# Patient Record
Sex: MALE | Race: WHITE | Employment: FULL TIME | URBAN - METROPOLITAN AREA
[De-identification: names, ages, dates, MRNs, and addresses within clinical notes are randomized per-mention and may not be internally consistent; named-entity substitution may affect disease eponyms.]

---

## 2017-09-12 ENCOUNTER — LAB ENCOUNTER (OUTPATIENT)
Dept: LAB | Facility: HOSPITAL | Age: 37
End: 2017-09-12
Payer: OTHER GOVERNMENT

## 2017-09-12 DIAGNOSIS — Z31.41 ENCOUNTER FOR SEMEN ANALYSIS: Primary | ICD-10-CM

## 2017-09-12 PROCEDURE — 89321 SEMEN ANAL SPERM DETECTION: CPT

## 2021-11-12 ENCOUNTER — LAB ENCOUNTER (OUTPATIENT)
Dept: LAB | Age: 41
End: 2021-11-12
Attending: FAMILY MEDICINE
Payer: OTHER GOVERNMENT

## 2021-11-12 DIAGNOSIS — Z00.00 ANNUAL PHYSICAL EXAM: ICD-10-CM

## 2021-11-12 PROCEDURE — 85025 COMPLETE CBC W/AUTO DIFF WBC: CPT

## 2021-11-12 PROCEDURE — 80053 COMPREHEN METABOLIC PANEL: CPT

## 2021-11-12 PROCEDURE — 84443 ASSAY THYROID STIM HORMONE: CPT

## 2021-11-12 PROCEDURE — 80061 LIPID PANEL: CPT

## 2021-11-12 PROCEDURE — 36415 COLL VENOUS BLD VENIPUNCTURE: CPT

## 2021-11-12 NOTE — PROGRESS NOTES
Subjective:   Patient ID: Shelia Welsh is a 36year old male. New patient. Doing well. No complaints . Needs refill on fluoxetine. Takes that for anxiety/depression. History/Other:   Review of Systems     Constitutional: Negative.   Negative Refills   • FLUoxetine 20 MG Oral Cap 90 capsule 1     Sig: Take 1 capsule (20 mg total) by mouth daily.        Imaging & Referrals:  FLULAVAL INFLUENZA VACCINE QUAD PRESERVATIVE FREE 0.5 ML  OP REFERRAL TO PSYCHIATRY

## 2021-12-07 ENCOUNTER — TELEPHONE (OUTPATIENT)
Dept: FAMILY MEDICINE CLINIC | Facility: CLINIC | Age: 41
End: 2021-12-07

## 2021-12-07 RX ORDER — MULTIVITAMIN
TABLET ORAL
COMMUNITY

## 2022-11-19 ENCOUNTER — IMMUNIZATION (OUTPATIENT)
Dept: LAB | Age: 42
End: 2022-11-19
Attending: EMERGENCY MEDICINE
Payer: OTHER GOVERNMENT

## 2022-11-19 DIAGNOSIS — Z23 NEED FOR VACCINATION: Primary | ICD-10-CM

## 2022-11-19 PROCEDURE — 0134A SARSCOV2 VAC BVL 50MCG/0.5ML: CPT

## 2023-01-27 ENCOUNTER — LAB ENCOUNTER (OUTPATIENT)
Dept: LAB | Facility: HOSPITAL | Age: 43
End: 2023-01-27
Attending: FAMILY MEDICINE
Payer: OTHER GOVERNMENT

## 2023-01-27 ENCOUNTER — OFFICE VISIT (OUTPATIENT)
Dept: FAMILY MEDICINE CLINIC | Facility: CLINIC | Age: 43
End: 2023-01-27

## 2023-01-27 VITALS
RESPIRATION RATE: 18 BRPM | BODY MASS INDEX: 26.98 KG/M2 | HEIGHT: 64 IN | HEART RATE: 66 BPM | DIASTOLIC BLOOD PRESSURE: 68 MMHG | OXYGEN SATURATION: 98 % | SYSTOLIC BLOOD PRESSURE: 106 MMHG | WEIGHT: 158 LBS

## 2023-01-27 DIAGNOSIS — Z00.00 ANNUAL PHYSICAL EXAM: ICD-10-CM

## 2023-01-27 DIAGNOSIS — J45.990 EXERCISE-INDUCED ASTHMA: ICD-10-CM

## 2023-01-27 DIAGNOSIS — Z00.00 ANNUAL PHYSICAL EXAM: Primary | ICD-10-CM

## 2023-01-27 LAB
ALBUMIN SERPL-MCNC: 4.3 G/DL (ref 3.4–5)
ALBUMIN/GLOB SERPL: 1.4 {RATIO} (ref 1–2)
ALP LIVER SERPL-CCNC: 48 U/L
ALT SERPL-CCNC: 26 U/L
ANION GAP SERPL CALC-SCNC: 4 MMOL/L (ref 0–18)
AST SERPL-CCNC: 12 U/L (ref 15–37)
BASOPHILS # BLD AUTO: 0.01 X10(3) UL (ref 0–0.2)
BASOPHILS NFR BLD AUTO: 0.2 %
BILIRUB SERPL-MCNC: 0.3 MG/DL (ref 0.1–2)
BUN BLD-MCNC: 15 MG/DL (ref 7–18)
BUN/CREAT SERPL: 14.7 (ref 10–20)
CALCIUM BLD-MCNC: 9.1 MG/DL (ref 8.5–10.1)
CHLORIDE SERPL-SCNC: 103 MMOL/L (ref 98–112)
CHOLEST SERPL-MCNC: 211 MG/DL (ref ?–200)
CO2 SERPL-SCNC: 32 MMOL/L (ref 21–32)
CREAT BLD-MCNC: 1.02 MG/DL
DEPRECATED RDW RBC AUTO: 38.5 FL (ref 35.1–46.3)
EOSINOPHIL # BLD AUTO: 0.07 X10(3) UL (ref 0–0.7)
EOSINOPHIL NFR BLD AUTO: 1.5 %
ERYTHROCYTE [DISTWIDTH] IN BLOOD BY AUTOMATED COUNT: 11.6 % (ref 11–15)
FASTING PATIENT LIPID ANSWER: YES
FASTING STATUS PATIENT QL REPORTED: YES
GFR SERPLBLD BASED ON 1.73 SQ M-ARVRAT: 94 ML/MIN/1.73M2 (ref 60–?)
GLOBULIN PLAS-MCNC: 3.1 G/DL (ref 2.8–4.4)
GLUCOSE BLD-MCNC: 87 MG/DL (ref 70–99)
HCT VFR BLD AUTO: 40.1 %
HDLC SERPL-MCNC: 46 MG/DL (ref 40–59)
HGB BLD-MCNC: 13.5 G/DL
IMM GRANULOCYTES # BLD AUTO: 0.01 X10(3) UL (ref 0–1)
IMM GRANULOCYTES NFR BLD: 0.2 %
LDLC SERPL CALC-MCNC: 143 MG/DL (ref ?–100)
LYMPHOCYTES # BLD AUTO: 2.33 X10(3) UL (ref 1–4)
LYMPHOCYTES NFR BLD AUTO: 48.8 %
MCH RBC QN AUTO: 30.3 PG (ref 26–34)
MCHC RBC AUTO-ENTMCNC: 33.7 G/DL (ref 31–37)
MCV RBC AUTO: 90.1 FL
MONOCYTES # BLD AUTO: 0.27 X10(3) UL (ref 0.1–1)
MONOCYTES NFR BLD AUTO: 5.7 %
NEUTROPHILS # BLD AUTO: 2.08 X10 (3) UL (ref 1.5–7.7)
NEUTROPHILS # BLD AUTO: 2.08 X10(3) UL (ref 1.5–7.7)
NEUTROPHILS NFR BLD AUTO: 43.6 %
NONHDLC SERPL-MCNC: 165 MG/DL (ref ?–130)
OSMOLALITY SERPL CALC.SUM OF ELEC: 288 MOSM/KG (ref 275–295)
PLATELET # BLD AUTO: 182 10(3)UL (ref 150–450)
POTASSIUM SERPL-SCNC: 4.3 MMOL/L (ref 3.5–5.1)
PROT SERPL-MCNC: 7.4 G/DL (ref 6.4–8.2)
RBC # BLD AUTO: 4.45 X10(6)UL
SODIUM SERPL-SCNC: 139 MMOL/L (ref 136–145)
TRIGL SERPL-MCNC: 122 MG/DL (ref 30–149)
TSI SER-ACNC: 1.1 MIU/ML (ref 0.36–3.74)
VLDLC SERPL CALC-MCNC: 23 MG/DL (ref 0–30)
WBC # BLD AUTO: 4.8 X10(3) UL (ref 4–11)

## 2023-01-27 PROCEDURE — 99396 PREV VISIT EST AGE 40-64: CPT | Performed by: FAMILY MEDICINE

## 2023-01-27 PROCEDURE — 3008F BODY MASS INDEX DOCD: CPT | Performed by: FAMILY MEDICINE

## 2023-01-27 PROCEDURE — 84443 ASSAY THYROID STIM HORMONE: CPT

## 2023-01-27 PROCEDURE — 80061 LIPID PANEL: CPT

## 2023-01-27 PROCEDURE — 85025 COMPLETE CBC W/AUTO DIFF WBC: CPT

## 2023-01-27 PROCEDURE — 80053 COMPREHEN METABOLIC PANEL: CPT

## 2023-01-27 PROCEDURE — 36415 COLL VENOUS BLD VENIPUNCTURE: CPT

## 2023-01-27 PROCEDURE — 3078F DIAST BP <80 MM HG: CPT | Performed by: FAMILY MEDICINE

## 2023-01-27 PROCEDURE — 3074F SYST BP LT 130 MM HG: CPT | Performed by: FAMILY MEDICINE

## 2023-01-27 RX ORDER — ALBUTEROL SULFATE 90 UG/1
2 AEROSOL, METERED RESPIRATORY (INHALATION) EVERY 6 HOURS PRN
Qty: 3 EACH | Refills: 0 | Status: SHIPPED | OUTPATIENT
Start: 2023-01-27

## 2023-06-26 RX ORDER — TAMSULOSIN HYDROCHLORIDE 0.4 MG/1
CAPSULE ORAL
Qty: 30 CAPSULE | Refills: 0 | OUTPATIENT
Start: 2023-06-26

## 2023-06-26 NOTE — TELEPHONE ENCOUNTER
Last read by Lynda Baum at  5:57 PM on 6/23/2023. Patient saw [address] message sent--->see above. No response was received.      Order being refused --> prescriber not at this practice

## 2023-07-14 ENCOUNTER — OFFICE VISIT (OUTPATIENT)
Dept: FAMILY MEDICINE CLINIC | Facility: CLINIC | Age: 43
End: 2023-07-14

## 2023-07-14 ENCOUNTER — TELEPHONE (OUTPATIENT)
Dept: FAMILY MEDICINE CLINIC | Facility: CLINIC | Age: 43
End: 2023-07-14

## 2023-07-14 VITALS
HEIGHT: 64 IN | OXYGEN SATURATION: 95 % | SYSTOLIC BLOOD PRESSURE: 112 MMHG | HEART RATE: 72 BPM | DIASTOLIC BLOOD PRESSURE: 64 MMHG | WEIGHT: 151.63 LBS | RESPIRATION RATE: 16 BRPM | BODY MASS INDEX: 25.89 KG/M2

## 2023-07-14 DIAGNOSIS — R39.15 URINARY URGENCY: ICD-10-CM

## 2023-07-14 DIAGNOSIS — E78.5 HYPERLIPIDEMIA, UNSPECIFIED HYPERLIPIDEMIA TYPE: Primary | ICD-10-CM

## 2023-07-14 PROCEDURE — 3078F DIAST BP <80 MM HG: CPT | Performed by: FAMILY MEDICINE

## 2023-07-14 PROCEDURE — 3074F SYST BP LT 130 MM HG: CPT | Performed by: FAMILY MEDICINE

## 2023-07-14 PROCEDURE — 99214 OFFICE O/P EST MOD 30 MIN: CPT | Performed by: FAMILY MEDICINE

## 2023-07-14 PROCEDURE — 3008F BODY MASS INDEX DOCD: CPT | Performed by: FAMILY MEDICINE

## 2023-07-14 RX ORDER — ALBUTEROL SULFATE 90 UG/1
2 AEROSOL, METERED RESPIRATORY (INHALATION) EVERY 6 HOURS PRN
Qty: 3 EACH | Refills: 1 | Status: SHIPPED | OUTPATIENT
Start: 2023-07-14

## 2023-07-14 RX ORDER — MIRABEGRON 25 MG/1
25 TABLET, FILM COATED, EXTENDED RELEASE ORAL DAILY
Qty: 90 TABLET | Refills: 1 | Status: SHIPPED | OUTPATIENT
Start: 2023-07-14 | End: 2024-01-10

## 2023-07-14 NOTE — PROGRESS NOTES
Subjective:   Patient ID: Domingo Waddell is a 43year old male. Medication Follow-Up      Here for f/u exercise asthma - albuterol working great   Also has problems with urinary urgency for last 20 years   Flomax was given by another physician but is helping much   Patient did try myrbetriq in the past and was more helpful. Patient also is concerned about side effects of other medications for urgency ie potential trisk of dementia and is requesting this med      History/Other:   Review of Systems  Constitutional: Negative. Negative for activity change, appetite change, diaphoresis and fatigue. Respiratory: see hpi     Cardiovascular: Negative. Negative for chest pain, palpitations and leg swelling. Gastrointestinal: Negative. Negative for abdominal pain. Skin: Negative. urologic see hpi   Psychiatric/Behavioral: Negative. \  Current Outpatient Medications   Medication Sig Dispense Refill    albuterol (PROVENTIL HFA) 108 (90 Base) MCG/ACT Inhalation Aero Soln Inhale 2 puffs into the lungs every 6 (six) hours as needed for Wheezing. 3 each 1    Mirabegron ER (MYRBETRIQ) 25 MG Oral Tablet 24 Hr Take 1 tablet (25 mg total) by mouth daily. 90 tablet 1    FLUoxetine HCl 40 MG Oral Cap Take 1 capsule (40 mg total) by mouth daily. Misc Natural Products (OSTEO BI-FLEX ADV DOUBLE ST OR) Take by mouth. Multiple Vitamin (MULTI-VITAMIN DAILY) Oral Tab Take by mouth. tamsulosin (FLOMAX) cap       clotrimazole 1 % External Cream Apply topically 2 (two) times daily. FLUoxetine 20 MG Oral Cap Take 1 capsule (20 mg total) by mouth daily. 90 capsule 1     Allergies:Not on File    Objective:   Physical Exam  Constitutional:       Appearance: He is well-developed. Cardiovascular:      Rate and Rhythm: Normal rate and regular rhythm. Heart sounds: Normal heart sounds. Pulmonary:      Effort: Pulmonary effort is normal.      Breath sounds: Normal breath sounds.    Abdominal: General: Bowel sounds are normal.      Palpations: Abdomen is soft. There is no mass. Hernia: No hernia is present. Neurological:      Mental Status: He is alert. Deep Tendon Reflexes: Reflexes are normal and symmetric. Assessment & Plan:   Hyperlipidemia, unspecified hyperlipidemia type  (primary encounter diagnosis)  Urinary urgency  Exercise induced asthma   Doing well   Start myrbetriq   F/u in 2 months   No orders of the defined types were placed in this encounter. Meds This Visit:  Requested Prescriptions     Signed Prescriptions Disp Refills    albuterol (PROVENTIL HFA) 108 (90 Base) MCG/ACT Inhalation Aero Soln 3 each 1     Sig: Inhale 2 puffs into the lungs every 6 (six) hours as needed for Wheezing. Mirabegron ER (MYRBETRIQ) 25 MG Oral Tablet 24 Hr 90 tablet 1     Sig: Take 1 tablet (25 mg total) by mouth daily.        Imaging & Referrals:  None

## 2023-07-14 NOTE — TELEPHONE ENCOUNTER
Prior authorization for myrbetriq was done through [employer].  It can take 1-5 business days for a decision to come back

## 2023-07-17 NOTE — TELEPHONE ENCOUNTER
Pa was denied but patient was able to fill 3 months, see below     Denied    CaseId:[id number];Status:Denied; Review Type:Prior Auth; Appeal Information: Attention:ATTN: [address] [id number]. [place],[id number] [phone] [facility]:[phone];  Important - Please read the below note on eAppeals: Please reference the denial letter for information on the rights for an appeal, rationale for the denial, and how to submit an appeal including if any information is needed to support the appeal. Note about urgent situations - Generally, an urgent situation is one which, in the opinion of the provider, the health of the patient may be in serious jeopardy or may experience pain that cannot be adequately controlled while waiting for a decision on the appeal.; Case ID: [id number]      Payer: [address] [place] [address]    [phone]   Electronic appeal: Supported   View History

## 2023-10-13 ENCOUNTER — LAB ENCOUNTER (OUTPATIENT)
Dept: LAB | Age: 43
End: 2023-10-13
Attending: FAMILY MEDICINE
Payer: OTHER GOVERNMENT

## 2023-10-13 ENCOUNTER — OFFICE VISIT (OUTPATIENT)
Dept: FAMILY MEDICINE CLINIC | Facility: CLINIC | Age: 43
End: 2023-10-13
Payer: OTHER GOVERNMENT

## 2023-10-13 VITALS
HEIGHT: 64 IN | DIASTOLIC BLOOD PRESSURE: 55 MMHG | BODY MASS INDEX: 26.26 KG/M2 | RESPIRATION RATE: 16 BRPM | HEART RATE: 62 BPM | WEIGHT: 153.81 LBS | OXYGEN SATURATION: 97 % | SYSTOLIC BLOOD PRESSURE: 105 MMHG

## 2023-10-13 DIAGNOSIS — R00.2 PALPITATION: Primary | ICD-10-CM

## 2023-10-13 DIAGNOSIS — R00.2 PALPITATION: ICD-10-CM

## 2023-10-13 LAB
ATRIAL RATE: 59 BPM
P AXIS: 33 DEGREES
P-R INTERVAL: 128 MS
Q-T INTERVAL: 424 MS
QRS DURATION: 86 MS
QTC CALCULATION (BEZET): 419 MS
R AXIS: 11 DEGREES
T AXIS: 62 DEGREES
VENTRICULAR RATE: 59 BPM

## 2023-10-13 PROCEDURE — 90471 IMMUNIZATION ADMIN: CPT | Performed by: FAMILY MEDICINE

## 2023-10-13 PROCEDURE — 3008F BODY MASS INDEX DOCD: CPT | Performed by: FAMILY MEDICINE

## 2023-10-13 PROCEDURE — 90686 IIV4 VACC NO PRSV 0.5 ML IM: CPT | Performed by: FAMILY MEDICINE

## 2023-10-13 PROCEDURE — 90472 IMMUNIZATION ADMIN EACH ADD: CPT | Performed by: FAMILY MEDICINE

## 2023-10-13 PROCEDURE — 3078F DIAST BP <80 MM HG: CPT | Performed by: FAMILY MEDICINE

## 2023-10-13 PROCEDURE — 93005 ELECTROCARDIOGRAM TRACING: CPT

## 2023-10-13 PROCEDURE — 3074F SYST BP LT 130 MM HG: CPT | Performed by: FAMILY MEDICINE

## 2023-10-13 PROCEDURE — 90677 PCV20 VACCINE IM: CPT | Performed by: FAMILY MEDICINE

## 2023-10-13 PROCEDURE — 93010 ELECTROCARDIOGRAM REPORT: CPT | Performed by: STUDENT IN AN ORGANIZED HEALTH CARE EDUCATION/TRAINING PROGRAM

## 2023-10-13 PROCEDURE — 99214 OFFICE O/P EST MOD 30 MIN: CPT | Performed by: FAMILY MEDICINE

## 2023-10-13 NOTE — PROGRESS NOTES
Subjective:   Patient ID: Zane Grijalva is a 43year old male. HPI  Patient here for f/u   Doing well with myrbetriq   Complaining about some palpitations- not related to exercise   Its short lived and denies any shortness or chest pain with that     History/Other:   Review of Systems    Constitutional: Negative. Negative for activity change, appetite change, diaphoresis and fatigue. Respiratory: Negative. Negative for apnea, cough, chest tightness and shortness of breath. Cardiovascular: see hpi   Gastrointestinal: Negative. Negative for abdominal pain. Urologic see hpi   Current Outpatient Medications   Medication Sig Dispense Refill    albuterol (PROVENTIL HFA) 108 (90 Base) MCG/ACT Inhalation Aero Soln Inhale 2 puffs into the lungs every 6 (six) hours as needed for Wheezing. 3 each 1    Mirabegron ER (MYRBETRIQ) 25 MG Oral Tablet 24 Hr Take 1 tablet (25 mg total) by mouth daily. 90 tablet 1    FLUoxetine HCl 40 MG Oral Cap Take 1 capsule (40 mg total) by mouth daily. Misc Natural Products (OSTEO BI-FLEX ADV DOUBLE ST OR) Take by mouth. Multiple Vitamin (MULTI-VITAMIN DAILY) Oral Tab Take by mouth. clotrimazole 1 % External Cream Apply topically 2 (two) times daily. FLUoxetine 20 MG Oral Cap Take 1 capsule (20 mg total) by mouth daily. 90 capsule 1     Allergies:Not on File    Objective:   Physical Exam  Constitutional:       Appearance: He is well-developed. Cardiovascular:      Rate and Rhythm: Normal rate and regular rhythm. Heart sounds: Normal heart sounds. Pulmonary:      Effort: Pulmonary effort is normal.      Breath sounds: Normal breath sounds. Abdominal:      General: Bowel sounds are normal.      Palpations: Abdomen is soft. Neurological:      Mental Status: He is alert. Deep Tendon Reflexes: Reflexes are normal and symmetric.          Assessment & Plan:   Palpitation  (primary encounter diagnosis)  Will do EKG   Urinary urgency   Doing great woith myrbetriq   F/u in few months   Orders Placed This Encounter      Fluzone Quadrivalent 6mo+ 0.5mL      Prevnar 20 (PCV20) [id number]      Meds This Visit:  Requested Prescriptions      No prescriptions requested or ordered in this encounter       Imaging & Referrals:  INFLUENZA VACCINE, QUAD, PRESERVATIVE FREE, 0.5 ML  PCV20 VACCINE FOR INTRAMUSCULAR USE  EKG 12-LEAD

## 2023-10-17 ENCOUNTER — NURSE TRIAGE (OUTPATIENT)
Dept: FAMILY MEDICINE CLINIC | Facility: CLINIC | Age: 43
End: 2023-10-17

## 2023-10-17 ENCOUNTER — OFFICE VISIT (OUTPATIENT)
Dept: INTERNAL MEDICINE CLINIC | Facility: CLINIC | Age: 43
End: 2023-10-17

## 2023-10-17 VITALS
BODY MASS INDEX: 26.36 KG/M2 | HEIGHT: 64 IN | SYSTOLIC BLOOD PRESSURE: 128 MMHG | WEIGHT: 154.38 LBS | HEART RATE: 61 BPM | DIASTOLIC BLOOD PRESSURE: 82 MMHG | OXYGEN SATURATION: 98 %

## 2023-10-17 DIAGNOSIS — T50.Z95A ADVERSE EFFECT OF VACCINE, INITIAL ENCOUNTER: Primary | ICD-10-CM

## 2023-10-17 PROBLEM — R35.0 FREQUENCY OF URINATION: Status: ACTIVE | Noted: 2023-10-17

## 2023-10-17 PROBLEM — G89.29 CHRONIC HEADACHE DISORDER: Status: ACTIVE | Noted: 2023-10-17

## 2023-10-17 PROBLEM — J45.990 EXERCISE INDUCED BRONCHOSPASM: Status: ACTIVE | Noted: 2017-06-05

## 2023-10-17 PROBLEM — G47.9 SLEEP DISTURBANCES: Status: ACTIVE | Noted: 2023-10-17

## 2023-10-17 PROBLEM — B07.9 VERRUCA: Status: ACTIVE | Noted: 2023-10-17

## 2023-10-17 PROBLEM — M54.2 CERVICALGIA: Status: ACTIVE | Noted: 2018-12-03

## 2023-10-17 PROBLEM — G25.81 RESTLESS LEGS SYNDROME: Status: ACTIVE | Noted: 2023-10-17

## 2023-10-17 PROBLEM — N32.81 OVERACTIVE BLADDER: Status: ACTIVE | Noted: 2023-10-17

## 2023-10-17 PROBLEM — F80.81 STUTTERING: Status: ACTIVE | Noted: 2023-10-17

## 2023-10-17 PROBLEM — G47.00 INSOMNIA, UNSPECIFIED: Status: ACTIVE | Noted: 2018-12-03

## 2023-10-17 PROBLEM — F41.9 ANXIETY DISORDER, UNSPECIFIED: Status: ACTIVE | Noted: 2018-12-03

## 2023-10-17 PROBLEM — R25.1 TREMOR: Status: ACTIVE | Noted: 2023-10-17

## 2023-10-17 PROBLEM — N39.41 URGE INCONTINENCE OF URINE: Status: ACTIVE | Noted: 2023-10-17

## 2023-10-17 PROBLEM — D72.819 LEUKOPENIA: Status: ACTIVE | Noted: 2023-10-17

## 2023-10-17 PROBLEM — G47.33 OBSTRUCTIVE SLEEP APNEA SYNDROME: Status: ACTIVE | Noted: 2023-10-17

## 2023-10-17 PROBLEM — F17.201 NICOTINE DEPENDENCE IN REMISSION: Status: ACTIVE | Noted: 2023-10-17

## 2023-10-17 PROBLEM — J45.990 EXERCISE INDUCED BRONCHOSPASM (HCC): Status: ACTIVE | Noted: 2017-06-05

## 2023-10-17 PROBLEM — H52.00 HYPEROPIA: Status: ACTIVE | Noted: 2023-10-17

## 2023-10-17 PROBLEM — R51.9 CHRONIC HEADACHE DISORDER: Status: ACTIVE | Noted: 2023-10-17

## 2023-10-17 PROBLEM — R59.1 LYMPHADENOPATHY: Status: ACTIVE | Noted: 2023-10-17

## 2023-10-17 PROBLEM — F33.1 MODERATE EPISODE OF RECURRENT MAJOR DEPRESSIVE DISORDER (HCC): Status: ACTIVE | Noted: 2023-10-17

## 2023-10-17 PROBLEM — R06.83 SNORING: Status: ACTIVE | Noted: 2023-10-17

## 2023-10-17 PROBLEM — R41.89 MODERATE COGNITIVE IMPAIRMENT: Status: ACTIVE | Noted: 2023-10-17

## 2023-10-17 PROBLEM — E78.5 HYPERLIPIDEMIA, UNSPECIFIED: Status: ACTIVE | Noted: 2018-12-03

## 2023-10-17 PROBLEM — H52.229 REGULAR ASTIGMATISM: Status: ACTIVE | Noted: 2023-10-17

## 2023-10-17 PROBLEM — D64.9 ANEMIA: Status: ACTIVE | Noted: 2023-10-17

## 2023-10-17 PROBLEM — J31.0 RHINITIS: Status: ACTIVE | Noted: 2023-10-17

## 2023-10-17 PROBLEM — H43.399 VITREOUS FLOATERS: Status: ACTIVE | Noted: 2023-10-17

## 2023-10-17 PROBLEM — S06.0XAA BRAIN CONCUSSION: Status: ACTIVE | Noted: 2023-10-17

## 2023-10-17 PROBLEM — R39.198 SLOWING OF URINARY STREAM: Status: ACTIVE | Noted: 2023-10-17

## 2023-10-17 PROCEDURE — 3008F BODY MASS INDEX DOCD: CPT | Performed by: INTERNAL MEDICINE

## 2023-10-17 PROCEDURE — 3079F DIAST BP 80-89 MM HG: CPT | Performed by: INTERNAL MEDICINE

## 2023-10-17 PROCEDURE — 3074F SYST BP LT 130 MM HG: CPT | Performed by: INTERNAL MEDICINE

## 2023-10-17 PROCEDURE — 99212 OFFICE O/P EST SF 10 MIN: CPT | Performed by: INTERNAL MEDICINE

## 2023-10-17 NOTE — TELEPHONE ENCOUNTER
Please reply to pool: EM RN TRIAGE  Action Requested: Summary for Provider     []  Critical Lab, Recommendations Needed  [] Need Additional Advice  [x]   FYI    []   Need Orders  [] Need Medications Sent to Pharmacy  []  Other     SUMMARY: Patient's wife contacts clinic reporting headache and nausea since receiving flu and pneumonia vaccine on 10/13. C/o temporal headache and nausea. Fatigue. Denies blurred vision, fever, chest pain or shortness of breath. Headache gets better with rest but always returns. Acute visit booked for assessment.       Reason for call: Acute  Onset: Data Unavailable                       Reason for Disposition   Mild immunization reaction    Protocols used: Immunization [id number]

## 2023-10-17 NOTE — PROGRESS NOTES
Lindy Felder is a 43year old male who is here for  Adverse effect of vaccine, initial encounter  (primary encounter diagnosis)      HPI:   Lindy Felder is a 43year old male presents for headache and nausea. He contacted clinic reporting symptoms of headache and nausea since receiving flu and pneumonia vaccine on 10/13. Started feeling fatigued. Throbbing temporal headache 5-7/10 and nausea. Myalgias, Fatigue. Denies blurred vision, fever, chest pain or shortness of breath, no rash, difficulty swallowing, no swelling. Past Medical History:   Diagnosis Date    Anxiety     Depression     Sleep apnea     Tinnitus      Past Surgical History:   Procedure Laterality Date    APPENDECTOMY      11 yr old    OTHER SURGICAL HISTORY      devaited spetum, repair of pallete    TONSILLECTOMY      [id number]    VASECTOMY  2013       Current Outpatient Medications:     albuterol (PROVENTIL HFA) 108 (90 Base) MCG/ACT Inhalation Aero Soln, Inhale 2 puffs into the lungs every 6 (six) hours as needed for Wheezing., Disp: 3 each, Rfl: 1    Mirabegron ER (MYRBETRIQ) 25 MG Oral Tablet 24 Hr, Take 1 tablet (25 mg total) by mouth daily. , Disp: 90 tablet, Rfl: 1    FLUoxetine HCl 40 MG Oral Cap, Take 1 capsule (40 mg total) by mouth daily. , Disp: , Rfl:     Misc Natural Products (OSTEO BI-FLEX ADV DOUBLE ST OR), Take by mouth., Disp: , Rfl:     Multiple Vitamin (MULTI-VITAMIN DAILY) Oral Tab, Take by mouth., Disp: , Rfl:     clotrimazole 1 % External Cream, Apply topically 2 (two) times daily. , Disp: , Rfl:     FLUoxetine 20 MG Oral Cap, Take 1 capsule (20 mg total) by mouth daily. , Disp: 90 capsule, Rfl: 1    Allergies:No Known Allergies  Social History    Socioeconomic History      Marital status:       Spouse name: Not on file      Number of children: Not on file      Years of education: Not on file      Highest education level: Not on file    Occupational History      Not on file    Tobacco Use      Smoking status: Former      Smokeless tobacco: Never    Vaping Use      Vaping Use: Never used    Substance and Sexual Activity      Alcohol use: Never      Drug use: Never      Sexual activity: Not on file    Other Topics      Concerns:        Not on file    Social History Narrative      Not on file    Social Determinants of Health  Financial Resource Strain: Not on file  Food Insecurity: Not on file  Transportation Needs: Not on file  Physical Activity: Not on file  Stress: Not on file  Social Connections: Not on file  Housing Stability: Not on file    REVIEW OF SYSTEMS:     GENERAL HEALTH: No fevers, chills, sweats, fatigue  VISION: No recent vision problems, blurry vision or double vision  HEENT: No decreased hearing ear pain nasal congestion or sore throat  SKIN: denies any unusual skin lesions or rashes  RESPIRATORY: denies shortness of breath, cough, wheezing  CARDIOVASCULAR: denies chest pain on exertion, palpitations, swelling in feet  GI: denies abdominal pain and denies heartburn, nausea or vomiting  : No Pain on urination, change in the color of urine, discharge, urinating frequently  MUS: No back pain, joint pain, muscle pain  NEURO: + headaches ,no  anxiety, depression    EXAM:      10/17/23  1235   BP: 128/82   Pulse: 61   SpO2: 98%   Weight: 154 lb 6.4 oz (70 kg)   Height: 5' 4\" (1.626 m)     GENERAL: well developed, well nourished,in no apparent distress  SKIN: no rashes,no suspicious lesions  HEENT: atraumatic, normocephalic,ears and throat are clear,   NECK: supple,no adenopathy,  LUNGS: clear to auscultation, no wheeze  CARDIO: RRR without murmur  GI: good BS's,no masses or tenderness  EXTREMITIES: no cyanosis, or edema    ASSESSMENT AND PLAN:   1. Adverse effect of vaccine, initial encounter  -patient with headache and nausea.   -He contacted clinic reporting symptoms of headache and nausea since receiving flu and pneumonia vaccine on 10/13.   -Started feeling fatigued.    Plan:  -continue supportive treatment    The patient indicates understanding of these issues and agrees to the plan. Return if symptoms worsen or fail to improve.     Vini Gomez MD  10/17/2023

## 2023-12-19 DIAGNOSIS — R39.15 URINARY URGENCY: ICD-10-CM

## 2023-12-20 RX ORDER — MIRABEGRON 25 MG/1
25 TABLET, FILM COATED, EXTENDED RELEASE ORAL DAILY
Qty: 90 TABLET | Refills: 1 | Status: SHIPPED | OUTPATIENT
Start: 2023-12-20 | End: 2024-06-17

## 2023-12-20 NOTE — TELEPHONE ENCOUNTER
Refill passed per [facility] protocol. Requested Prescriptions   Pending Prescriptions Disp Refills    Mirabegron ER (MYRBETRIQ) 25 MG Oral Tablet 24 Hr 90 tablet 1     Sig: Take 1 tablet (25 mg total) by mouth daily.        Genitourinary Medications Passed - 12/19/2023  6:25 PM        Passed - Patient does not have pulmonary hypertension on problem list        Passed - In person appointment or virtual visit in the past 12 mos or appointment in next 3 mos     Recent Outpatient Visits              2 months ago Adverse effect of vaccine, initial encounter    Jd Campbell MD    Office Visit    2 months ago Ana Wells MD    Office Visit    5 months ago Hyperlipidemia, unspecified hyperlipidemia type    Tomasz Campbell MD    Office Visit    10 months ago Annual physical exam    Ewelina Lopez MD    Office Visit    1 year ago Bria Osborn MD    Office Visit                         Recent Outpatient Visits              2 months ago Adverse effect of vaccine, initial encounter    Jd Campbell MD    Office Visit    2 months ago Ana Wells MD    Office Visit    5 months ago Hyperlipidemia, unspecified hyperlipidemia type    Ewelina Lopez MD    Office Visit    10 months ago Annual physical exam    Ewelina Lopez MD    Office Visit    1 year ago Bria Osborn MD    Office Visit

## 2024-06-06 ENCOUNTER — OFFICE VISIT (OUTPATIENT)
Dept: FAMILY MEDICINE CLINIC | Facility: CLINIC | Age: 44
End: 2024-06-06
Payer: OTHER GOVERNMENT

## 2024-06-06 VITALS
HEIGHT: 64 IN | RESPIRATION RATE: 16 BRPM | DIASTOLIC BLOOD PRESSURE: 78 MMHG | OXYGEN SATURATION: 96 % | HEART RATE: 75 BPM | SYSTOLIC BLOOD PRESSURE: 108 MMHG | WEIGHT: 157.81 LBS | BODY MASS INDEX: 26.94 KG/M2

## 2024-06-06 DIAGNOSIS — Z13.6 SCREENING FOR HEART DISEASE: ICD-10-CM

## 2024-06-06 DIAGNOSIS — R51.9 NONINTRACTABLE EPISODIC HEADACHE, UNSPECIFIED HEADACHE TYPE: ICD-10-CM

## 2024-06-06 DIAGNOSIS — L98.9 SKIN LESION: ICD-10-CM

## 2024-06-06 DIAGNOSIS — Z00.00 ANNUAL PHYSICAL EXAM: Primary | ICD-10-CM

## 2024-06-06 PROCEDURE — 99396 PREV VISIT EST AGE 40-64: CPT | Performed by: FAMILY MEDICINE

## 2024-06-07 NOTE — PROGRESS NOTES
Subjective:   Patient ID: Caden Matta is a 43 year old male.    HPI  Here for annual physical   Also complaining about frequent headaches  Had in the past concussion but headaches continue   Otherwise has some skin nevi wanted to be checked   History/Other:   Review of Systems  Constitutional: Negative.  Negative for activity change, appetite change, diaphoresis and fatigue.     Respiratory: Negative.  Negative for apnea, cough, chest tightness and shortness of breath.    Cardiovascular: Negative.  Negative for chest pain, palpitations and leg swelling.   Gastrointestinal: Negative.  Negative for abdominal pain.   Skin:see hpi       neuro see hpi    Psychiatric/Behavioral: Negative.          Current Outpatient Medications   Medication Sig Dispense Refill    Mirabegron ER (MYRBETRIQ) 25 MG Oral Tablet 24 Hr Take 1 tablet (25 mg total) by mouth daily. 90 tablet 1    albuterol (PROVENTIL HFA) 108 (90 Base) MCG/ACT Inhalation Aero Soln Inhale 2 puffs into the lungs every 6 (six) hours as needed for Wheezing. 3 each 1    FLUoxetine HCl 40 MG Oral Cap Take 1 capsule (40 mg total) by mouth daily.      Misc Natural Products (OSTEO BI-FLEX ADV DOUBLE ST OR) Take by mouth.      Multiple Vitamin (MULTI-VITAMIN DAILY) Oral Tab Take by mouth.      clotrimazole 1 % External Cream Apply topically 2 (two) times daily.      FLUoxetine 20 MG Oral Cap Take 1 capsule (20 mg total) by mouth daily. 90 capsule 1     Allergies:No Known Allergies    Objective:   Physical Exam  Constitutional:       Appearance: He is well-developed.   Cardiovascular:      Rate and Rhythm: Normal rate and regular rhythm.      Heart sounds: Normal heart sounds.   Pulmonary:      Effort: Pulmonary effort is normal.      Breath sounds: Normal breath sounds.   Abdominal:      General: Bowel sounds are normal.      Palpations: Abdomen is soft.   Skin:     General: Skin is warm and dry.   Neurological:      Mental Status: He is alert.      Deep Tendon  Reflexes: Reflexes are normal and symmetric.         Assessment & Plan:   1. Annual physical exam    2. Screening for heart disease    3. Skin lesion    4. Nonintractable episodic headache, unspecified headache type    Will see neurologist   Otherwise well   Diet and exerise discussed    Orders Placed This Encounter   Procedures    Comp Metabolic Panel (14)    Lipid Panel    Assay, Thyroid Stim Hormone    CBC With Differential With Platelet       Meds This Visit:  Requested Prescriptions      No prescriptions requested or ordered in this encounter       Imaging & Referrals:  DERM - INTERNAL  NEURO - INTERNAL  CT CALCIUM SCORING

## 2024-06-08 ENCOUNTER — LAB ENCOUNTER (OUTPATIENT)
Dept: LAB | Facility: HOSPITAL | Age: 44
End: 2024-06-08
Attending: FAMILY MEDICINE
Payer: OTHER GOVERNMENT

## 2024-06-08 DIAGNOSIS — Z00.00 ANNUAL PHYSICAL EXAM: ICD-10-CM

## 2024-06-08 LAB
ALBUMIN SERPL-MCNC: 4.7 G/DL (ref 3.2–4.8)
ALBUMIN/GLOB SERPL: 1.9 {RATIO} (ref 1–2)
ALP LIVER SERPL-CCNC: 51 U/L
ALT SERPL-CCNC: 19 U/L
ANION GAP SERPL CALC-SCNC: 2 MMOL/L (ref 0–18)
AST SERPL-CCNC: 30 U/L (ref ?–34)
BASOPHILS # BLD AUTO: 0.02 X10(3) UL (ref 0–0.2)
BASOPHILS NFR BLD AUTO: 0.5 %
BILIRUB SERPL-MCNC: 0.5 MG/DL (ref 0.3–1.2)
BUN BLD-MCNC: 13 MG/DL (ref 9–23)
BUN/CREAT SERPL: 11.9 (ref 10–20)
CALCIUM BLD-MCNC: 9.7 MG/DL (ref 8.7–10.4)
CHLORIDE SERPL-SCNC: 109 MMOL/L (ref 98–112)
CHOLEST SERPL-MCNC: 257 MG/DL (ref ?–200)
CO2 SERPL-SCNC: 30 MMOL/L (ref 21–32)
CREAT BLD-MCNC: 1.09 MG/DL
DEPRECATED RDW RBC AUTO: 40.5 FL (ref 35.1–46.3)
EGFRCR SERPLBLD CKD-EPI 2021: 86 ML/MIN/1.73M2 (ref 60–?)
EOSINOPHIL # BLD AUTO: 0.08 X10(3) UL (ref 0–0.7)
EOSINOPHIL NFR BLD AUTO: 1.9 %
ERYTHROCYTE [DISTWIDTH] IN BLOOD BY AUTOMATED COUNT: 11.8 % (ref 11–15)
FASTING PATIENT LIPID ANSWER: YES
FASTING STATUS PATIENT QL REPORTED: YES
GLOBULIN PLAS-MCNC: 2.5 G/DL (ref 2–3.5)
GLUCOSE BLD-MCNC: 88 MG/DL (ref 70–99)
HCT VFR BLD AUTO: 41.6 %
HDLC SERPL-MCNC: 43 MG/DL (ref 40–59)
HGB BLD-MCNC: 13.7 G/DL
IMM GRANULOCYTES # BLD AUTO: 0.01 X10(3) UL (ref 0–1)
IMM GRANULOCYTES NFR BLD: 0.2 %
LDLC SERPL CALC-MCNC: 192 MG/DL (ref ?–100)
LYMPHOCYTES # BLD AUTO: 2.07 X10(3) UL (ref 1–4)
LYMPHOCYTES NFR BLD AUTO: 48.9 %
MCH RBC QN AUTO: 30.9 PG (ref 26–34)
MCHC RBC AUTO-ENTMCNC: 32.9 G/DL (ref 31–37)
MCV RBC AUTO: 93.7 FL
MONOCYTES # BLD AUTO: 0.33 X10(3) UL (ref 0.1–1)
MONOCYTES NFR BLD AUTO: 7.8 %
NEUTROPHILS # BLD AUTO: 1.72 X10 (3) UL (ref 1.5–7.7)
NEUTROPHILS # BLD AUTO: 1.72 X10(3) UL (ref 1.5–7.7)
NEUTROPHILS NFR BLD AUTO: 40.7 %
NONHDLC SERPL-MCNC: 214 MG/DL (ref ?–130)
OSMOLALITY SERPL CALC.SUM OF ELEC: 292 MOSM/KG (ref 275–295)
PLATELET # BLD AUTO: 180 10(3)UL (ref 150–450)
POTASSIUM SERPL-SCNC: 4.8 MMOL/L (ref 3.5–5.1)
PROT SERPL-MCNC: 7.2 G/DL (ref 5.7–8.2)
RBC # BLD AUTO: 4.44 X10(6)UL
SODIUM SERPL-SCNC: 141 MMOL/L (ref 136–145)
TRIGL SERPL-MCNC: 121 MG/DL (ref 30–149)
TSI SER-ACNC: 0.88 MIU/ML (ref 0.55–4.78)
VLDLC SERPL CALC-MCNC: 25 MG/DL (ref 0–30)
WBC # BLD AUTO: 4.2 X10(3) UL (ref 4–11)

## 2024-06-08 PROCEDURE — 80053 COMPREHEN METABOLIC PANEL: CPT

## 2024-06-08 PROCEDURE — 84443 ASSAY THYROID STIM HORMONE: CPT

## 2024-06-08 PROCEDURE — 85025 COMPLETE CBC W/AUTO DIFF WBC: CPT

## 2024-06-08 PROCEDURE — 36415 COLL VENOUS BLD VENIPUNCTURE: CPT

## 2024-06-08 PROCEDURE — 80061 LIPID PANEL: CPT

## 2024-06-13 ENCOUNTER — TELEPHONE (OUTPATIENT)
Dept: FAMILY MEDICINE CLINIC | Facility: CLINIC | Age: 44
End: 2024-06-13

## 2024-06-13 NOTE — TELEPHONE ENCOUNTER
Dr. Lopez, please advise if ok for patient to try lifestyle modifications and see how CT calcium test results before deciding to start cholesterol medication or if he needs to start medication now. Thank you.    Spoke to patient (name and  of patient verified). He reports he has not made any lifestyle changes to improve cholesterol levels. Reports he often eats at restaurants and likes to eat ice cream.  He would like to try adjusting lifestyle before starting medication if that is safe to do  After some research, noted some cholesterol medications can increase risk of dementia. Patients father had dementia, concerned about this side effect  Grandfather passed away from congestive heart failure, also concerned about risks of high cholesterol.  Has CT calcium screen in August. Would like to work on lifestyle modifications first and see how testing comes back before starting medication.  [employer] message sent with education obtained from [facility].    Component      Latest Ref Rng 2023   Cholesterol, Total      <200 mg/dL 211 (H)  257 (H)    HDL Cholesterol      40 - 59 mg/dL 46  43    Triglycerides      30 - 149 mg/dL 122  121    LDL Cholesterol Calc      <100 mg/dL 143 (H)  192 (H)    VLDL      0 - 30 mg/dL 23  25    NON-HDL CHOLESTEROL      <130 mg/dL 165 (H)  214 (H)    Patient Fasting for Lipid? Yes  Yes       Legend:  (H) High      Result note from labs collected 24:   Hello,  Your blood tests are good except lipid panel which is very elevated  You should start taking medication  Please let me know so I can send it to the pharmacy   Written by Gabriela Lopez MD on 2024  6:11 PM CDT  Seen by patient Caden QUEVEDO Sigrid on 2024  6:29 PM

## 2024-06-26 DIAGNOSIS — R39.15 URINARY URGENCY: ICD-10-CM

## 2024-06-28 RX ORDER — MIRABEGRON 25 MG/1
25 TABLET, FILM COATED, EXTENDED RELEASE ORAL DAILY
Qty: 90 TABLET | Refills: 3 | Status: SHIPPED | OUTPATIENT
Start: 2024-06-28

## 2024-06-28 NOTE — TELEPHONE ENCOUNTER
REFILL PASSED PER [facility]    Requested Prescriptions   Pending Prescriptions Disp Refills    MYRBETRIQ 25 MG Oral Tablet 24 Hr [Pharmacy Med Name: Myrbetriq Er 24 Hr 25 Mg Tab Aste] 90 tablet 0     Sig: Take 1 tablet by mouth daily.       Genitourinary Medications Passed - 6/28/2024 11:58 AM        Passed - Patient does not have pulmonary hypertension on problem list        Passed - In person appointment or virtual visit in the past 12 mos or appointment in next 3 mos     Recent Outpatient Visits              3 weeks ago [facility] physical [facility]    [facility]    8 months ago Adverse effect of vaccine, initial encounter    [facility] Eugenia Ernst MD    Office Visit    8 months ago Palpitation    [facility]    [facility] Visit    11 months ago Hyperlipidemia, unspecified hyperlipidemia type    [facility]    [facility]    1 year ago [facility]          Future Appointments         Provider Department Appt Notes    In 1 month [facility] RN RADIOLOGY 1 CALCIUM SCORE; [facility] CT [address] Interested in learning about the condition of my major arteries and cholesterol    In 1 month Gely Bentley MD [facility] full body ck    In 2 months Bennett Moses DO [facility] ref by dr hoffman/Nonintractable episodic headache, unspecified headache type/                          Future Appointments         Provider Department Appt Notes    In 1 month [facility] RN RADIOLOGY 1 CALCIUM SCORE; [facility] CT [address] [place] [address] Interested in  learning about the condition of my major arteries and cholesterol    In 1 month Gely Bentley MD [facility] full body ck    In 2 months Bennett Moses DO [facility] ref by dr hoffman/Nonintractable episodic headache, unspecified headache type/           Recent Outpatient Visits              3 weeks ago [facility]    [facility]    8 months ago Adverse effect of vaccine, initial encounter    [facility]    Office Visit    8 months ago [facility]pitation    [facility]    11 months ago Hyperlipidemia, unspecified hyperlipidemia type    [facility]    [facility]    1 year ago Annual physical exam    [facility] Gabriela Lindsay MD    Office Visit

## 2024-08-05 ENCOUNTER — HOSPITAL ENCOUNTER (OUTPATIENT)
Dept: CT IMAGING | Facility: HOSPITAL | Age: 44
End: 2024-08-05
Attending: FAMILY MEDICINE

## 2024-08-05 DIAGNOSIS — Z13.6 SCREENING FOR HEART DISEASE: ICD-10-CM

## 2024-08-05 NOTE — PROGRESS NOTES
Date of Service 8/5/2024    ROSARIO DAVIS  Date of Birth 12/29/1980    Patient Age: 43 year old    PCP: Gabriela Lopez MD  [address] [place] [id number]    Heart Scan Consult  Preliminary Heart Scan Score: 0    Previous Screening  Heart Scan Completed Previously: No        Peripheral Vascular Scan Completed Previously: No          Risk Factors  Personal Risk Factors  Non-alterable Risk Factors: Personal History;Age;Gender;Family History  Alterable Risk Factors: Abnormal Cholesterol;Unhealthy eating          Blood Pressure  There were no vitals taken for this visit.  (Normal =< 120/80,  Elevated = 120-129/ >80,  High Stage1 130-139/80-89 , Stage2 >140/>90)    Lipid Profile  Cholesterol: 257, done on 6/8/2024.  HDL Cholesterol: 43, done on 6/8/2024.  LDL Cholesterol: 192, done on 6/8/2024.  TriGlycerides 121, done on 6/8/2024.    Cholesterol Goals  Value   Total  =< 200   HDL  = > 45 Men = > 55 Women   LDL   =< 100   Triglycerides  =< 150       Glucose and Hemoglobin A1C  Lab Results   Component Value Date    GLU 88 06/08/2024     (Normal Fasting Glucose < 100mg/dl )    Nurse Review  Risk factor information and results reviewed with Nurse: Yes    Recommended Follow Up:  Consult your physician regarding::   Final Heart Scan Report;  Discuss potential for Incidental Finding;  Discuss Potential for Score Variance      Recommendations for Change:  Nutrition Changes: Low Saturated Fat;Low Fat Dairy;Increase Fiber    Cholesterol Modification (goal of therapy depends upon your risk):   Increase HDL (Healthy/Good) Normal >45 Men >55 Women;  Decrease LDL (Lousy/Bad) Ideal <100;  Decrease Triglycerides (Ugly) Normal <150     (Today's FASTING [employer] Values:  Total Cholesterol-201, HDL-41, LDL-145, Triglycerides-77, Glucose-81)    Exercise: Enhance Current Program                   Repeat Heart Scan:   5 years if Calcium Score is 0.0              [place] Recommended Resources:  Recommended Resources: Upcoming  Classes, Medical Services and Health Library [email or web address]     Other Resources:: Educational handouts provided.      Mac ARGUETA RN        Please Contact the Nurse Heart Line with any Questions or Concerns [phone].   No

## 2024-08-09 ENCOUNTER — OFFICE VISIT (OUTPATIENT)
Dept: DERMATOLOGY CLINIC | Facility: CLINIC | Age: 44
End: 2024-08-09

## 2024-08-09 DIAGNOSIS — Z12.83 SKIN CANCER SCREENING: ICD-10-CM

## 2024-08-09 DIAGNOSIS — D23.9 BENIGN NEOPLASM OF SKIN, UNSPECIFIED LOCATION: ICD-10-CM

## 2024-08-09 DIAGNOSIS — D22.9 MULTIPLE NEVI: Primary | ICD-10-CM

## 2024-08-09 PROCEDURE — 99203 OFFICE O/P NEW LOW 30 MIN: CPT | Performed by: DERMATOLOGY

## 2024-08-09 RX ORDER — KETOCONAZOLE 20 MG/G
CREAM TOPICAL
Qty: 60 G | Refills: 3 | Status: SHIPPED | OUTPATIENT
Start: 2024-08-09

## 2024-08-09 NOTE — PROGRESS NOTES
Caden Matta is a 43 year old male.  HPI:     CC:    Chief Complaint   Patient presents with    Full Skin Exam     New Patient presents for a Full Body Skin Exam. Pt denies any any lesions of concern. Pt has chronic cracked skin of the feet and brittle toenails. Patient has tried clotrimazole 1 % External Cream as needed x 10 years.   Pt denies a personal or family Hx of skin ca.           HISTORY:    Past Medical History:    Anxiety    Depression    Sleep apnea    Tinnitus      Past Surgical History:   Procedure Laterality Date    Appendectomy      5 yr old    Other surgical history      devaited spetum, repair of pallete    Tonsillectomy      [id number]    Vasectomy  2013      Family History   Problem Relation Age of Onset    Hypertension Father     Lipids Father     Diabetes Father     Dementia Father     Heart Disorder Father     Stroke Father     Other (Other) Maternal Grandfather         alcholoism    Heart Disorder Paternal Grandfather     Cancer Other         breast      Social History     Socioeconomic History    Marital status:    Tobacco Use    Smoking status: Former    Smokeless tobacco: Never   Vaping Use    Vaping status: Never Used   Substance and Sexual Activity    Alcohol use: Never    Drug use: Never   Other Topics Concern    Reaction to local anesthetic No    Pt has a pacemaker No    Pt has a defibrillator No        Current Outpatient Medications   Medication Sig Dispense Refill    ketoconazole 2 % External Cream Apply to feet bid prn  athlete's foot/ itching 60 g 3    Mirabegron ER (MYRBETRIQ) 25 MG Oral Tablet 24 Hr Take 1 tablet (25 mg total) by mouth daily. 90 tablet 3    albuterol (PROVENTIL HFA) 108 (90 Base) MCG/ACT Inhalation Aero Soln Inhale 2 puffs into the lungs every 6 (six) hours as needed for Wheezing. 3 each 1    FLUoxetine HCl 40 MG Oral Cap Take 1 capsule (40 mg total) by mouth daily.      Misc Natural Products (OSTEO BI-FLEX ADV DOUBLE ST OR) Take by  mouth.      Multiple Vitamin (MULTI-VITAMIN DAILY) Oral Tab Take by mouth.      clotrimazole 1 % External Cream Apply topically 2 (two) times daily.      FLUoxetine 20 MG Oral Cap Take 1 capsule (20 mg total) by mouth daily. 90 capsule 1     Allergies:   No Known Allergies    Past Medical History:    Anxiety    Depression    Sleep apnea    Tinnitus     Past Surgical History:   Procedure Laterality Date    Appendectomy      5 yr old    Other surgical history      devaited spetum, repair of pallete    Tonsillectomy      [id number]    Vasectomy  2013     Social History     Socioeconomic History    Marital status:      Spouse name: Not on file    Number of children: Not on file    Years of education: Not on file    Highest education level: Not on file   Occupational History    Not on file   Tobacco Use    Smoking status: Former    Smokeless tobacco: Never   Vaping Use    Vaping status: Never Used   Substance and Sexual Activity    Alcohol use: Never    Drug use: Never    Sexual activity: Not on file   Other Topics Concern    Grew up on a farm Not Asked    History of tanning Not Asked    Outdoor occupation Not Asked    Reaction to local anesthetic No    Pt has a pacemaker No    Pt has a defibrillator No   Social History Narrative    Not on file     Social Determinants of Health     Financial Resource Strain: Not on file   Food Insecurity: Not on file   Transportation Needs: Not on file   Physical Activity: Not on file   Stress: Not on file   Social Connections: Not on file   Housing Stability: Not on file     Family History   Problem Relation Age of Onset    Hypertension Father     Lipids Father     Diabetes Father     Dementia Father     Heart Disorder Father     Stroke Father     Other (Other) Maternal Grandfather         alcholoism    Heart Disorder Paternal Grandfather     Cancer Other         breast       There were no vitals filed for this visit.    HPI:  Chief Complaint   Patient presents with    Full Skin  Exam     New Patient presents for a Full Body Skin Exam. Pt denies any any lesions of concern. Pt has chronic cracked skin of the feet and brittle toenails. Patient has tried clotrimazole 1 % External Cream as needed x 10 years.   Pt denies a personal or family Hx of skin ca.     No personal  or family history of skin cancer    Patient presents with concerns above.    Patient has been in their usual state of health.     Past notes/ records and appropriate/relevant lab results including pathology and past body maps reviewed. Including outside notes/ PCP notes as appropriate. Updated and new information noted in current visit.     ROS:  Denies other relevant systemic complaints.      History, medications, allergies reviewed as noted.    Allergies:  Patient has no known allergies.     Physical Examination:     Well-developed well-nourished patient alert oriented in no acute distress.  Exam performed, including scalp, head, neck, face,nails, hair, external eyes, including conjunctival mucosa, eyelids, lips external ears , arms, digits,palms.     Multiple light to medium brown, well marginated, uniformly pigmented, macules and papules 6 mm and less scattered on exam. pigmented lesions examined with dermoscopy benign-appearing patterns.     Waxy tannish keratotic papules scattered, cherry-red vascular papules scattered.    See map today's date for lesions noted .  See assessment and plan below for specific lesions.    Otherwise remarkable for lesions as noted on map.    See A/P  below for additional information:    Assessment / plan:    No orders of the defined types were placed in this encounter.      Meds & Refills for this Visit:  Requested Prescriptions     Signed Prescriptions Disp Refills    ketoconazole 2 % External Cream 60 g 3     Sig: Apply to feet bid prn  athlete's foot/ itching         Encounter Diagnoses   Name Primary?    Multiple nevi Yes    Benign neoplasm of skin, unspecified location     Skin cancer  screening      Scattered lentigines, sun damage temples, possible early AK's monitor, increase sunscreen.  Extensive nevi lentigines, reassurance benign patterns    Patient with scaling fissuring at toes, consistent with ephelides foot continue over-the-counter products few pustules.  Some pits in the nails suggestive of psoriasis.   continue antifungal creams-ketoconazole cream daily to twice daily  In particular over areas of pustules.    Benign-appearing nevi, no atypical features on dermoscopy reassurance given monitor.     Waxy tan keratotic papules lesions in areas of concern as noted reassurance given.  Benign nature discussed.  Possibility of cryo, alphahydroxy acids over-the-counter retinol's discussed.     No other susupicious lesions on todays  exam.    Please refer to map for specific lesions.  See additional diagnoses.  Pros cons of various therapies, risks benefits discussed.Pathophysiology discussed with patient.  Therapeutic options reviewed.  See  Medications in grid.  Instructions reviewed at length.    Benign nevi, seborrheic  keratoses, cherry angiomas:  Reassurance regarding other benign skin lesions.Signs and symptoms of skin cancer, ABCDE's of melanoma discussed with patient. Sunscreen (broad-spectrum, ideally mineral-based-UVA/UVB -SPF 30 or higher) use encouraged, sun protection/sun protective clothing, self exams reviewed Followup as noted RTC ---routine checkup    6 mos -one year or p.r.n.    Encounter Times   Including precharting, reviewing chart, prior notes obtaining history: 10 minutes, medical exam :10 minutes, notes on body map, plan, counseling 10minutes My total time spent caring for the patient on the day of the encounter: 30 minutes     The patient indicates understanding of these issues and agrees to the plan.  The patient is asked to return as noted in follow-up/ above.    This note was generated using Dragon voice recognition software.  Please contact me regarding any  confusion resulting from errors in recognition..  Note to patient and family: The 21st Century Cures Act makes medical notes like these available to patients. However, be advised this is a medical document. It is intended as [id number] communication and monitoring of a patient's care needs. It is written in medical language and may contain abbreviations or verbiage that are unfamiliar. It may appear blunt or direct. Medical documents are intended to carry relevant information, facts as evident and the clinical opinion of the practitioner.

## 2024-08-29 PROBLEM — G43.709 CHRONIC MIGRAINE W/O AURA W/O STATUS MIGRAINOSUS, NOT INTRACTABLE: Status: ACTIVE | Noted: 2024-08-29

## 2024-08-30 ENCOUNTER — TELEPHONE (OUTPATIENT)
Dept: PHYSICAL MEDICINE AND REHAB | Facility: CLINIC | Age: 44
End: 2024-08-30

## 2024-08-30 NOTE — TELEPHONE ENCOUNTER
Initiated authorization for Botox 200U. CPT/HCPCS , 27598, dx:G43.709 with  automated service. If the patient has [employer] , a PA is required.    Insurance: [employer] Member ID# [id number]   Medication: Botox 200 units  Number of visits Approved: 1Status: Approved-Authorization is not required based on medical necessity when being performed, however is not a guarantee of payment and may be subject to review once claim is submitted-Covered Benefit   this will be 1 of 1  Dx: G43.709  Last Botox done: New start  Next Botox due around: Now  Authorization # n/a  BUY&BILL        Please contact the patient to schedule. thanks

## 2024-09-04 ENCOUNTER — TELEPHONE (OUTPATIENT)
Dept: NEUROLOGY | Facility: CLINIC | Age: 44
End: 2024-09-04

## 2024-09-04 ENCOUNTER — MED REC SCAN ONLY (OUTPATIENT)
Dept: NEUROLOGY | Facility: CLINIC | Age: 44
End: 2024-09-04

## 2024-09-04 ENCOUNTER — OFFICE VISIT (OUTPATIENT)
Dept: NEUROLOGY | Facility: CLINIC | Age: 44
End: 2024-09-04
Payer: OTHER GOVERNMENT

## 2024-09-04 DIAGNOSIS — G43.709 CHRONIC MIGRAINE W/O AURA W/O STATUS MIGRAINOSUS, NOT INTRACTABLE: Primary | ICD-10-CM

## 2024-09-04 PROCEDURE — 64615 CHEMODENERV MUSC MIGRAINE: CPT | Performed by: OTHER

## 2024-09-04 NOTE — PROGRESS NOTES
Paperwork noting that patient may bear financial responsibility for procedure(s) performed in clinic today signed prior to proceeding with procedure(s).    Furthermore, patient notified that they should contact their insurer to verify that your procedure/test has been approved and is a COVERED benefit.  Although the [facility] staff does its due diligence, the insurance carrier gives the disclaimer that \"Although the procedure is authorized, this does not guarantee payment.\"    Ultimately the patient is responsible for payment.    Botox is:  [x] Buy and Bill  [] Patient Supplied      NEW START    [x] I have discussed with patient that if their insurance changes they must contact the office right away with that information so that a new prior authorization can be completed.  Patient verbalized understanding that Botox cannot be performed without a current prior authorization in place with correct insurance.

## 2024-09-05 ENCOUNTER — TELEPHONE (OUTPATIENT)
Dept: NEUROLOGY | Facility: CLINIC | Age: 44
End: 2024-09-05

## 2024-09-05 NOTE — TELEPHONE ENCOUNTER
Contacted pt and schedule next round of botox. Pt had a question was wondering if it was normal when getting botox that provider only swabbed his forehead and nothing else. Looking for clarification. Pls advise.

## 2024-09-06 NOTE — TELEPHONE ENCOUNTER
As I have already explained to the patient's wife, most effective alcohol swabs are on the skin that is not covered by hair.  Presents for tingling down over the forehead and shoulders.    However we can try to swap directly his head next time.    If there is still concern and they want to switch to a different physician than I will suggest to reach out to [facility] for the Botox injections

## 2024-09-09 NOTE — TELEPHONE ENCOUNTER
Phone call returned to pt. Advised the pt of Dr. Mercedes's message. Pt verbalized understanding of message.

## 2024-12-04 ENCOUNTER — OFFICE VISIT (OUTPATIENT)
Dept: NEUROLOGY | Facility: CLINIC | Age: 44
End: 2024-12-04
Payer: OTHER GOVERNMENT

## 2024-12-04 ENCOUNTER — MED REC SCAN ONLY (OUTPATIENT)
Dept: NEUROLOGY | Facility: CLINIC | Age: 44
End: 2024-12-04

## 2024-12-04 DIAGNOSIS — G25.81 RESTLESS LEGS SYNDROME: ICD-10-CM

## 2024-12-04 DIAGNOSIS — G43.709 CHRONIC MIGRAINE W/O AURA W/O STATUS MIGRAINOSUS, NOT INTRACTABLE: Primary | ICD-10-CM

## 2024-12-04 PROCEDURE — 99214 OFFICE O/P EST MOD 30 MIN: CPT | Performed by: OTHER

## 2024-12-04 RX ORDER — PRAMIPEXOLE DIHYDROCHLORIDE 0.12 MG/1
0.12 TABLET ORAL EVERY EVENING
Qty: 90 TABLET | Refills: 0 | Status: SHIPPED | OUTPATIENT
Start: 2024-12-04 | End: 2025-03-04
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
## 2024-12-04 NOTE — PROGRESS NOTES
Paperwork noting that patient may bear financial responsibility for procedure(s) performed in clinic today signed prior to proceeding with procedure(s).    Furthermore, patient notified that they should contact their insurer to verify that your procedure/test has been approved and is a COVERED benefit.  Although the [facility] staff does its due diligence, the insurance carrier gives the disclaimer that \"Although the procedure is authorized, this does not guarantee payment.\"    Ultimately the patient is responsible for payment.    Botox is:  [x] Buy and Bill  [] Patient Supplied      Botox Reauthorization Questions:  Has the patient experienced a reduction in frequency of migraines since starting Botox? yes  If yes, by what percentage? 85%  Has the intensity of migraines decreased since starting Botox? yes  If yes, by what percentage? 85%    [x] I have discussed with patient that if their insurance changes they must contact the office right away with that information so that a new prior authorization can be completed.  Patient verbalized understanding that Botox cannot be performed without a current prior authorization in place with correct insurance.

## 2025-01-21 ENCOUNTER — TELEPHONE (OUTPATIENT)
Dept: NEUROLOGY | Facility: CLINIC | Age: 45
End: 2025-01-21

## 2025-01-31 ENCOUNTER — TELEPHONE (OUTPATIENT)
Dept: PHYSICAL MEDICINE AND REHAB | Facility: CLINIC | Age: 45
End: 2025-01-31

## 2025-01-31 NOTE — TELEPHONE ENCOUNTER
Initiated authorization for Botox 200U. CPT/HCPCS , 59200, dx:G43.709 with [employer] automated service.     Insurance: [employer] Member ID# [id number]   Medication: Botox 200 units  Number of visits Approved: 1Status: Approved-Authorization is not required based on medical necessity when being performed, however is not a guarantee of payment and may be subject to review once claim is submitted-Covered Benefit   this will be 1 of 1  (PLEASE INFORM THE PATIENT TO CONTACT THE OFFICE IF HE INSURANCE CHANGES WITHIN THE YEAR AS HE/SHE WILL BE RESPONSIBLE TO UPDATE THE OFFICE IF INSURANCE CHANGES SO THAT PROCEDURE IS BILLED CORRECTLY)  Dx: G43.709  Last Botox done: 12/4/24  Next Botox due around: 2/26/25  Authorization # n/a  BUY&BILL  Authorization is not required based on medical necessity, however, is not a guarantee of payment and may be subject to review once claim is submitted.

## 2025-02-10 ENCOUNTER — APPOINTMENT (OUTPATIENT)
Dept: GENERAL RADIOLOGY | Age: 45
End: 2025-02-10
Payer: OTHER GOVERNMENT

## 2025-02-10 ENCOUNTER — HOSPITAL ENCOUNTER (OUTPATIENT)
Age: 45
Discharge: HOME | End: 2025-02-10
Payer: OTHER GOVERNMENT

## 2025-02-10 VITALS
TEMPERATURE: 98 F | DIASTOLIC BLOOD PRESSURE: 76 MMHG | HEART RATE: 78 BPM | SYSTOLIC BLOOD PRESSURE: 131 MMHG | RESPIRATION RATE: 18 BRPM | OXYGEN SATURATION: 98 %

## 2025-02-10 DIAGNOSIS — S93.402A MODERATE LEFT ANKLE SPRAIN, INITIAL ENCOUNTER: Primary | ICD-10-CM

## 2025-02-10 PROCEDURE — 73630 X-RAY EXAM OF FOOT: CPT

## 2025-02-10 PROCEDURE — 99213 OFFICE O/P EST LOW 20 MIN: CPT

## 2025-02-10 PROCEDURE — 99203 OFFICE O/P NEW LOW 30 MIN: CPT

## 2025-02-10 PROCEDURE — 73610 X-RAY EXAM OF ANKLE: CPT

## 2025-02-10 NOTE — ED INITIAL ASSESSMENT (HPI)
States he rolled and twisted his left ankle when he stepped on a stick running today. Pain to dorsal foot and ankle. Took Advil for pain. + distal CMS.

## 2025-02-10 NOTE — DISCHARGE INSTRUCTIONS
There is no fracture on your x-ray.  You likely have a sprain of your ankle.  Wear the splint for comfort.  Rest, ice and elevate.  Take Tylenol and Motrin for pain as needed.  If you develop any numbness, tingling, acutely worsening pain, swelling or any other concerning complaints you should go to the emergency department.  If you have persistent pain for more than the next week make an appointment to see the orthopedic specialist.  Otherwise follow-up with your primary care doctor.

## 2025-02-10 NOTE — ED PROVIDER NOTES
Patient Seen in: [facility]      History     Chief Complaint   Patient presents with    Ankle Injury     Stated Complaint: Ankle injury  Subjective:   Caden is a 44 year old male presenting to the immediate care complaining of left ankle pain.  Patient states he went for a run this morning and stepped on a stick causing himself to roll his left ankle and fall.  Patient did not hit his head.  He denies any head, neck or back pain.  Patient states he initially did not have much pain to the ankle or anywhere else so he finished his run, went home and then went to work.  States that throughout the day today he has had increased throbbing pain to the left ankle and increasing swelling so he came in for evaluation.  States he has been able to ambulate but has pain.  He denies any weakness, numbness, tingling to his foot.  Denies any other injury or trauma.  He has not other complaints or concerns.          Objective:   Past Medical History:    Anxiety    Depression    Sleep apnea    Tinnitus            Past Surgical History:   Procedure Laterality Date    Appendectomy      5 yr old    Other surgical history      devaited spetum, repair of pallete    Tonsillectomy      [id number]    Vasectomy  2013              Social History     Socioeconomic History    Marital status:    Tobacco Use    Smoking status: Former    Smokeless tobacco: Never   Vaping Use    Vaping status: Never Used   Substance and Sexual Activity    Alcohol use: Never    Drug use: Never   Other Topics Concern    Reaction to local anesthetic No    Pt has a pacemaker No    Pt has a defibrillator No            Review of Systems    Positive for stated complaint: Ankle Injury    Other systems are as noted in HPI.  Constitutional and vital signs reviewed.      All other systems reviewed and negative except as noted above.    Physical Exam     ED Triage Vitals [02/10/25 1456]   /76   Pulse 78   Resp 18   Temp 98.4 °F (36.9 °C)   Temp src  Oral   SpO2 98 %   O2 Device None (Room air)     Current:/76   Pulse 78   Temp 98.4 °F (36.9 °C) (Oral)   Resp 18   SpO2 98%     Physical Exam  Vitals and nursing note reviewed.   Constitutional:       General: He is not in acute distress.     Appearance: Normal appearance. He is not ill-appearing, toxic-appearing or diaphoretic.   HENT:      Head: Normocephalic.   Cardiovascular:      Rate and Rhythm: Normal rate.   Pulmonary:      Effort: Pulmonary effort is normal.   Musculoskeletal:         General: Normal range of motion.      Cervical back: Normal range of motion.      Right ankle: Normal.      Left ankle: Swelling and ecchymosis present. No deformity or lacerations. Normal range of motion. Normal pulse.      Left Achilles Tendon: Normal.      Right foot: Normal.      Left foot: Normal range of motion and normal capillary refill. Swelling, deformity, tenderness and bony tenderness present. No laceration or crepitus. Normal pulse.      Comments: Positive CMS.  2+ DP and PT pulses. Capillary refill is less than 2 seconds. Patient does have full range of motion to the entire left foot and all 5 toes.  The left calf, knee, and thigh and hip are unremarkable.  There is no erythema or warmth noted.  Ecchymosis and swelling noted to the dorsum of the foot near the ankle.  No abrasions or lacerations noted.  No obvious deformity is noted.  No bleeding noted.  Patient does have tenderness with palpation to the left mid-foot.  Patient also has significant swelling to the left lateral malleolus with some tenderness.  No medial malleolus swelling or tenderness.  Plantar foot is unremarkable.     Skin:     General: Skin is warm and dry.      Capillary Refill: Capillary refill takes less than 2 seconds.   Neurological:      General: No focal deficit present.      Mental Status: He is alert and oriented to person, place, and time.   Psychiatric:         Mood and Affect: Mood normal.         Behavior: Behavior  normal.         Thought Content: Thought content normal.         Judgment: Judgment normal.         ED Course   Radiology:    XR ANKLE (MIN 3 VIEWS), LEFT (CPT=73610)   Final Result   PROCEDURE: XR ANKLE (MIN 3 VIEWS), LEFT (CPT=73610)       COMPARISON: None.       INDICATIONS: Pain of the left foot and left ankle post rolling injury    today.       Findings and impression:       Anterior and lateral soft tissue swelling.  Normal alignment with no    fracture   Finalized by (CST): Chris Montiel MD on 2/10/2025 at 3:16 PM                        XR FOOT, COMPLETE (MIN 3 VIEWS), LEFT (CPT=73630)   Final Result   PROCEDURE: XR FOOT, COMPLETE (MIN 3 VIEWS), LEFT (CPT=73630)       COMPARISON: None.       INDICATIONS: Pain of the left foot and left ankle post rolling injury    today.       Findings and impression:  Normal alignment with no fracture   Finalized by (CST): Chris Montiel MD on 2/10/2025 at 3:17 PM                          Labs Reviewed - No data to display    MDM     Medical Decision Making  Differential diagnoses reflecting the complexity of care include but are not limited to bony versus soft tissue injury to the left ankle.    Comorbidities that add complexity to management include: None  History obtained by an independent source was from: Patient  My independent interpretations of studies include: X-ray  Patient is well appearing, non-toxic and in no acute distress.  Vital signs are stable.     There is no fracture on x-ray per the radiology read.  Patient's history and physical exam are consistent with a sprain.  Ace wrap and ankle splint placed for comfort.  Recommended RICE.  Recommended using Tylenol and Motrin for pain.  Recommended that if the patient develop any numbness, tingling, acutely worsening pain, swelling or any other concerns or complaints they go to the emergency department.  Recommended that if patient has persistent pain they make an appointment to follow-up with the orthopedic  specialist.  Otherwise recommended follow-up with primary care doctor.  ED precautions discussed.  Patient (guardian) advised to follow up with PCP in 2-3 days.  Patient (guardian) agrees with this plan of care.  Patient (guardian) verbalizes understanding of discharge instructions and plan of care.      Amount and/or Complexity of Data Reviewed  Radiology: ordered and independent interpretation performed. Decision-making details documented in ED Course.    Risk  OTC drugs.        Disposition and Plan     Clinical Impression:  1. Moderate left ankle sprain, initial encounter         Disposition:  Discharge  2/10/2025  3:34 pm    Follow-up:  Tomas Balderrama PA  [address] [place] [id number]  [phone]                Medications Prescribed:  Discharge Medication List as of 2/10/2025  3:46 PM

## 2025-02-18 DIAGNOSIS — G25.81 RESTLESS LEGS SYNDROME: ICD-10-CM

## 2025-02-19 RX ORDER — PRAMIPEXOLE DIHYDROCHLORIDE 0.12 MG/1
0.12 TABLET ORAL EVERY EVENING
Qty: 90 TABLET | Refills: 0 | Status: SHIPPED | OUTPATIENT
Start: 2025-02-19 | End: 2025-05-20

## 2025-02-19 NOTE — TELEPHONE ENCOUNTER
Medication: pramipexole (MIRAPEX) 0.125 MG Oral Tab      Date of last refill: 12/04/2024 (#90/0)  [place] 12[place]04/2024     Last office visit: 12/04/2024 --- NON BOTOX  Due back to clinic per last office note:    Date next office visit scheduled:    Future Appointments   Date Time Provider Department Center   3/6/2025  3:45 PM Kiran Mercedes MD [facility]   3/12/2025  2:40 PM Bennett Moses DO [facility]           Last OV note recommendation:    Assessment  Will continue botox for prevention. Discussed sumatriptan for rescue. He will think about. Will give him information about it. He can send a [employer] message about it if he would like to fill it and we will send the rx.  Discuss tx of restless leg. They will decide if they want to add a medication.           Plan  1. Chronic migraine w/o aura w/o status migrainosus, not intractable  - SPECIALTY (OTHER) - INTERNAL     2. Restless legs syndrome  - pramipexole (MIRAPEX) 0.125 MG Oral Tab; Take 1 tablet (0.125 mg total) by mouth every evening. Take 3 hours before bedtime  Dispense: 90 tablet; Refill: 0

## 2025-03-06 ENCOUNTER — OFFICE VISIT (OUTPATIENT)
Dept: NEUROLOGY | Facility: CLINIC | Age: 45
End: 2025-03-06
Payer: OTHER GOVERNMENT

## 2025-03-06 DIAGNOSIS — G43.709 CHRONIC MIGRAINE W/O AURA W/O STATUS MIGRAINOSUS, NOT INTRACTABLE: Primary | ICD-10-CM

## 2025-03-06 PROCEDURE — 64615 CHEMODENERV MUSC MIGRAINE: CPT | Performed by: OTHER

## 2025-03-06 NOTE — PROGRESS NOTES
Paperwork noting that patient may bear financial responsibility for procedure(s) performed in clinic today signed prior to proceeding with procedure(s).    Furthermore, patient notified that they should contact their insurer to verify that your procedure/test has been approved and is a COVERED benefit.  Although the [facility] staff does its due diligence, the insurance carrier gives the disclaimer that \"Although the procedure is authorized, this does not guarantee payment.\"    Ultimately the patient is responsible for payment.    Botox is:  [x] Buy and Bill  [] Patient Supplied      Botox Reauthorization Questions:  Has the patient experienced a reduction in frequency of migraines since starting Botox? yes  If yes, by what percentage? 75%  Has the intensity of migraines decreased since starting Botox? yes  If yes, by what percentage? 75%    [x] I have discussed with patient that if their insurance changes they must contact the office right away with that information so that a new prior authorization can be completed.  Patient verbalized understanding that Botox cannot be performed without a current prior authorization in place with correct insurance.

## 2025-03-12 ENCOUNTER — OFFICE VISIT (OUTPATIENT)
Dept: NEUROLOGY | Facility: CLINIC | Age: 45
End: 2025-03-12
Payer: OTHER GOVERNMENT

## 2025-03-12 DIAGNOSIS — G25.81 RESTLESS LEGS SYNDROME: ICD-10-CM

## 2025-03-12 DIAGNOSIS — G43.709 CHRONIC MIGRAINE W/O AURA W/O STATUS MIGRAINOSUS, NOT INTRACTABLE: Primary | ICD-10-CM

## 2025-03-12 RX ORDER — PRAMIPEXOLE DIHYDROCHLORIDE 0.12 MG/1
0.12 TABLET ORAL EVERY EVENING
Qty: 90 TABLET | Refills: 1 | Status: SHIPPED | OUTPATIENT
Start: 2025-03-12 | End: 2025-09-08

## 2025-03-12 RX ORDER — SUMATRIPTAN SUCCINATE 100 MG/1
TABLET ORAL
Qty: 9 TABLET | Refills: 11 | Status: SHIPPED | OUTPATIENT
Start: 2025-03-12

## 2025-03-12 NOTE — PATIENT INSTRUCTIONS
Look into lyrica (pregabalin) for restless leg.  Avoid getting high velocity low amplitude adjustments (HVLA) involving your neck. This could cause a vertebral artery dissection.
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
## 2025-03-18 DIAGNOSIS — R39.15 URINARY URGENCY: ICD-10-CM

## 2025-03-21 RX ORDER — MIRABEGRON 25 MG/1
25 TABLET, FILM COATED, EXTENDED RELEASE ORAL DAILY
Qty: 90 TABLET | Refills: 3 | OUTPATIENT
Start: 2025-03-21

## 2025-03-21 NOTE — TELEPHONE ENCOUNTER
Disp Refills Start End    Mirabegron ER (MYRBETRIQ) 25 MG Oral Tablet 24 Hr 90 tablet 3 6/28/2024 --    Sig - Route: Take 1 tablet (25 mg total) by mouth daily. - Oral    Sent to pharmacy as: Mirabegron ER 25 MG Oral Tablet Extended Release 24 Hour (Myrbetriq)    E-Prescribing Status: Receipt confirmed by pharmacy (6/28/2024  4:51 PM CDT)      Associated Diagnoses    Urinary urgency        Pharmacy    [id number] - [place] - [address] [phone], [phone]

## 2025-03-31 ENCOUNTER — PATIENT MESSAGE (OUTPATIENT)
Dept: NEUROLOGY | Facility: CLINIC | Age: 45
End: 2025-03-31

## 2025-04-04 ENCOUNTER — TELEMEDICINE (OUTPATIENT)
Dept: NEUROLOGY | Facility: CLINIC | Age: 45
End: 2025-04-04
Payer: OTHER GOVERNMENT

## 2025-04-04 DIAGNOSIS — G25.81 RESTLESS LEGS SYNDROME: Primary | ICD-10-CM
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
## 2025-04-05 ENCOUNTER — LAB ENCOUNTER (OUTPATIENT)
Dept: LAB | Facility: HOSPITAL | Age: 45
End: 2025-04-05
Attending: Other
Payer: OTHER GOVERNMENT

## 2025-04-05 DIAGNOSIS — G25.81 RESTLESS LEGS SYNDROME: ICD-10-CM

## 2025-04-05 LAB
DEPRECATED HBV CORE AB SER IA-ACNC: 117 NG/ML
IRON SATN MFR SERPL: 32 %
IRON SERPL-MCNC: 92 UG/DL
TOTAL IRON BINDING CAPACITY: 287 UG/DL (ref 250–425)
TRANSFERRIN SERPL-MCNC: 214 MG/DL (ref 215–365)

## 2025-04-05 PROCEDURE — 84466 ASSAY OF TRANSFERRIN: CPT

## 2025-04-05 PROCEDURE — 83540 ASSAY OF IRON: CPT

## 2025-04-05 PROCEDURE — 82728 ASSAY OF FERRITIN: CPT

## 2025-04-05 PROCEDURE — 36415 COLL VENOUS BLD VENIPUNCTURE: CPT

## 2025-04-11 ENCOUNTER — TELEPHONE (OUTPATIENT)
Dept: PHYSICAL MEDICINE AND REHAB | Facility: CLINIC | Age: 45
End: 2025-04-11

## 2025-04-11 NOTE — TELEPHONE ENCOUNTER
Initiated authorization for Botox 200U. CPT/HCPCS , 90337, dx:G43.709 with [employer] automated service.      Insurance: [employer] Member ID# [id number]   Medication: Botox 200 units  Number of visits Approved: 200  (PLEASE INFORM THE PATIENT TO CONTACT THE OFFICE IF THE INSURANCE CHANGES WITHIN THE YEAR AS HE/SHE WILL BE RESPONSIBLE TO UPDATE THE OFFICE IF INSURANCE CHANGES SO THAT PROCEDURE IS BILLED CORRECTLY) this will be 1 of 1  Dx: G43.709  Last Botox done: 3/6/25  Next Botox due around: 5/29/25  Authorization # [id number]  Valid   BUY[employer]BILL  Authorization is not required based on medical necessity, however, is not a guarantee of payment and may be subject to review once claim is submitted.

## 2025-04-22 ENCOUNTER — OFFICE VISIT (OUTPATIENT)
Dept: FAMILY MEDICINE CLINIC | Facility: CLINIC | Age: 45
End: 2025-04-22

## 2025-04-22 VITALS
WEIGHT: 156 LBS | TEMPERATURE: 98 F | HEART RATE: 72 BPM | SYSTOLIC BLOOD PRESSURE: 96 MMHG | HEIGHT: 64 IN | BODY MASS INDEX: 26.63 KG/M2 | RESPIRATION RATE: 20 BRPM | DIASTOLIC BLOOD PRESSURE: 57 MMHG | OXYGEN SATURATION: 96 %

## 2025-04-22 DIAGNOSIS — F32.89 OTHER DEPRESSION: ICD-10-CM

## 2025-04-22 DIAGNOSIS — K13.0 CHEILITIS: ICD-10-CM

## 2025-04-22 DIAGNOSIS — M54.59 OTHER LOW BACK PAIN: Primary | ICD-10-CM

## 2025-04-22 PROCEDURE — 99215 OFFICE O/P EST HI 40 MIN: CPT | Performed by: FAMILY MEDICINE

## 2025-04-22 RX ORDER — CLOTRIMAZOLE 1 %
1 CREAM (GRAM) TOPICAL 2 TIMES DAILY
Qty: 30 G | Refills: 0 | Status: SHIPPED | OUTPATIENT
Start: 2025-04-22

## 2025-04-22 RX ORDER — FLUOXETINE HYDROCHLORIDE 40 MG/1
40 CAPSULE ORAL DAILY
Qty: 90 CAPSULE | Refills: 0 | Status: SHIPPED | OUTPATIENT
Start: 2025-04-22

## 2025-04-22 RX ORDER — TRIAMCINOLONE ACETONIDE 1 MG/G
1 CREAM TOPICAL 2 TIMES DAILY PRN
Qty: 60 G | Refills: 0 | Status: SHIPPED | OUTPATIENT
Start: 2025-04-22

## 2025-04-23 NOTE — PROGRESS NOTES
Subjective:   Patient ID: Caden Matta is a 44 year old male.    HPI  Coming for several issues - has low back pain   Denies any injury   Also has some cuts and inflammation at the angles of the moputh /lips  Needs refill of fluoxetine for depression - doing wel wuth that   Migraines are better overall getting botox injections  History/Other:   Review of Systems    Constitutional: Negative.  Negative for activity change, appetite change, diaphoresis and fatigue.     Respiratory: Negative.  Negative for apnea, cough, chest tightness and shortness of breath.    Cardiovascular: Negative.  Negative for chest pain, palpitations and leg swelling.   Gastrointestinal: Negative.  Negative for abdominal pain.   Skin: see hpi           Psychiatric/Behavioral: see hpi      Current Medications[1]  Allergies:Allergies[2]    Objective:   Physical Exam  Constitutional:       Appearance: He is well-developed.   HENT:      Head:      Comments: Cheilitis   Cardiovascular:      Rate and Rhythm: Normal rate and regular rhythm.      Heart sounds: Normal heart sounds.   Pulmonary:      Effort: Pulmonary effort is normal.      Breath sounds: Normal breath sounds.   Abdominal:      General: Bowel sounds are normal.      Palpations: Abdomen is soft.   Musculoskeletal:         General: Tenderness present.   Neurological:      Mental Status: He is alert.      Deep Tendon Reflexes: Reflexes are normal and symmetric.         Assessment & Plan:   1. Other low back pain    Start physical therapy   2. Cheilitis   Creams given   3. Depression cpm    No orders of the defined types were placed in this encounter.      Meds This Visit:  Requested Prescriptions     Signed Prescriptions Disp Refills    FLUoxetine HCl 40 MG Oral Cap 90 capsule 0     Sig: Take 1 capsule (40 mg total) by mouth daily.    FLUoxetine 20 MG Oral Cap 90 capsule 0     Sig: Take 1 capsule (20 mg total) by mouth daily.    clotrimazole 1 % External Cream 30 g 0      Sig: Apply 1 each topically 2 (two) times daily.    triamcinolone 0.1 % External Cream 60 g 0     Sig: Apply 1 each topically 2 (two) times daily as needed.       Imaging & Referrals:  PHYSICAL THERAPY - INTERNAL  XR LUMBAR SPINE (MIN 4 VIEWS) (CPT=72110)         [1]   Current Outpatient Medications   Medication Sig Dispense Refill    FLUoxetine HCl 40 MG Oral Cap Take 1 capsule (40 mg total) by mouth daily. 90 capsule 0    FLUoxetine 20 MG Oral Cap Take 1 capsule (20 mg total) by mouth daily. 90 capsule 0    clotrimazole 1 % External Cream Apply 1 each topically 2 (two) times daily. 30 g 0    triamcinolone 0.1 % External Cream Apply 1 each topically 2 (two) times daily as needed. 60 g 0    SUMAtriptan Succinate 100 MG Oral Tab Use at onset; repeat once after 2 HRS-ONLY 2 IN 24 HR MAX.  This is a 30 day supply. 9 tablet 11    Mirabegron ER (MYRBETRIQ) 25 MG Oral Tablet 24 Hr Take 1 tablet (25 mg total) by mouth daily. 90 tablet 3    albuterol (PROVENTIL HFA) 108 (90 Base) MCG/ACT Inhalation Aero Soln Inhale 2 puffs into the lungs every 6 (six) hours as needed for Wheezing. 3 each 1    Misc Natural Products (OSTEO BI-FLEX ADV DOUBLE ST OR) Take by mouth.      Multiple Vitamin (MULTI-VITAMIN DAILY) Oral Tab Take by mouth.     [2] No Known Allergies

## 2025-04-25 ENCOUNTER — TELEPHONE (OUTPATIENT)
Dept: FAMILY MEDICINE CLINIC | Facility: CLINIC | Age: 45
End: 2025-04-25

## 2025-04-25 DIAGNOSIS — M54.89 OTHER BACK PAIN, UNSPECIFIED CHRONICITY: Primary | ICD-10-CM

## 2025-04-25 NOTE — TELEPHONE ENCOUNTER
Patient is asking for an order to get xray's of the mid back area and for the neck area of his spine.

## 2025-04-25 NOTE — TELEPHONE ENCOUNTER
[Patient was seen in office on 4/22/25 by Dr. Lopez]    Jessica Gaviria- Please see below message and advise.

## 2025-04-29 ENCOUNTER — TELEPHONE (OUTPATIENT)
Dept: PHYSICAL THERAPY | Facility: HOSPITAL | Age: 45
End: 2025-04-29

## 2025-05-03 ENCOUNTER — HOSPITAL ENCOUNTER (OUTPATIENT)
Dept: GENERAL RADIOLOGY | Age: 45
Discharge: HOME | End: 2025-05-03
Attending: FAMILY MEDICINE
Payer: OTHER GOVERNMENT

## 2025-05-03 DIAGNOSIS — M54.59 OTHER LOW BACK PAIN: ICD-10-CM

## 2025-05-03 DIAGNOSIS — M54.89 OTHER BACK PAIN, UNSPECIFIED CHRONICITY: ICD-10-CM

## 2025-05-03 PROCEDURE — 72040 X-RAY EXAM NECK SPINE 2-3 VW: CPT | Performed by: FAMILY MEDICINE

## 2025-05-03 PROCEDURE — 72110 X-RAY EXAM L-2 SPINE 4/>VWS: CPT | Performed by: FAMILY MEDICINE

## 2025-05-03 PROCEDURE — 72072 X-RAY EXAM THORAC SPINE 3VWS: CPT | Performed by: FAMILY MEDICINE

## 2025-05-07 ENCOUNTER — TELEPHONE (OUTPATIENT)
Dept: PHYSICAL THERAPY | Facility: HOSPITAL | Age: 45
End: 2025-05-07

## 2025-05-08 ENCOUNTER — OFFICE VISIT (OUTPATIENT)
Dept: PHYSICAL THERAPY | Age: 45
End: 2025-05-08
Attending: FAMILY MEDICINE
Payer: OTHER GOVERNMENT

## 2025-05-08 DIAGNOSIS — M54.59 OTHER LOW BACK PAIN: Primary | ICD-10-CM

## 2025-05-08 PROCEDURE — 97110 THERAPEUTIC EXERCISES: CPT

## 2025-05-08 PROCEDURE — 97162 PT EVAL MOD COMPLEX 30 MIN: CPT

## 2025-05-08 NOTE — PROGRESS NOTES
SPINE EVALUATION:     Diagnosis:   Other low back pain (M54.59) Patient:  Caden Matta (44 year old, male)        Referring Provider: Gabriela Lopez  Today's Date   5/8/2025    Precautions:  None   Date of Evaluation: 05/08/25  Next MD visit: -  Date of Surgery: na     PATIENT SUMMARY   Summary of chief complaints: R LBP x6 months  History of current condition: Insidious onset late 2024, then fell while jogging early 2025 and continues to have R LBP which increases with static positioning/sleep and back extension, and which decreases with movement.   Pain level: current 3 /10, at best 2 /10, at worst 8 /10  Description of symptoms: pain   Occupation: , [employer]   Leisure activities/Hobbies: exercise   Prior level of function: without limitation  Current limitations: interrupted sleep, limited tolerance for prolonged positioning  Pt goals: elimination of pain  Red flag signs/symptoms: Pt denies dizziness, drop attacks, dysphagia, dysarthria, diplopia; Pt denies changes in bowel/bladder function, saddle anesthesia; Pt denies pain that wakes in sleep, fever, recent trauma, history of CA, pain unchanged with movement/activity    Past medical history was reviewed with Caden.  Significant findings include: -  Imaging/Tests:     Caden  has a past medical history of Anxiety, Depression, Sleep apnea, and Tinnitus.  He  has a past surgical history that includes appendectomy; vasectomy (2013); tonsillectomy; and other surgical history.    ASSESSMENT  Caden presents to physical therapy evaluation with primary c/o R LBP x6 months. The results of the objective tests and measures show weak hip abd, TTP R SI region, pelvic imbalance, tight hamstring, poor body mechanics knowledge. Functional deficits include but are not limited to interrupted sleep, limited tolerance for prolonged positioning. Signs and symptoms are consistent with diagnosis of Other low back pain (M54.59). Pt and PT discussed  evaluation findings, pathology, POC and HEP.  Pt voiced understanding and performs HEP correctly without reported pain. Skilled Physical Therapy is medically necessary to address the above impairments and reach functional goals.    OBJECTIVE:    Musculoskeletal:  Palpation: No TTP lumbar paraspinals, QL's or spinous processes     Special tests:   Pelvic alignment: R ant torsion vs L with apparent leg length discrepancy corrected with muscle energy technique.     ROM and Strength:  (* denotes performed with pain)  Trunk ROM MMT (-/5)     Flex WNL's       Ext Minimal w/ pain      R L R L     Side bend WNL's WNL's         Rotation WNL's WNL's       ,   Hip   ROM MMT (-/5)    R L R L     Flex (L2) WNL's WNL's 5 5     Ext  WNL's WNL's 5 5     Abd WNL's WNL's 4 4     ER WNL's WNL's         IR WNL's WNL's        ,   Knee   ROM MMT (-/5)    R L R L     Flex WNL's WNL's 5 5     Ext (L3) WNL's WNL's 5 5     ,   Ankle/Foot   ROM MMT (-/5)    R L R L     PF WNL's WNL's 5 5     DF (L4) WNL's WNL's 5 5     Inversion             Eversion             Grt Toe Ext (L5)     4+ 4+       Flexibility:  LE Flexibility R L     Hip Flexor         Hamstrings mod restricted (SLR to 65) mod restricted (SLR to 65)     ITB         Piriformis         Quads WNL WNL     Gastroc-soleus          Balance and Functional Mobility:  Gait: pt ambulates on level ground with normal mechanics.         Today's Treatment and Response:   Pt education was provided on exam findings, treatment diagnosis, treatment plan, expectations, and prognosis.  Today's Treatment       5/8/2025   Spine Treatment   Therapeutic Exercise - R LE Push/L LE pull 3x10\" followed by hip adductor squeeze 1x10\"  - Discussion of lifting/body mechanics including emphasis on lift from floor with squat  - Discussion of sleeping posture adding pillow under waist in prone, pillow under knees in supine, pillow between knees in sidelying.  - Discussion of avoiding and pain-provoking activities  or position, but otherwise to resume normal activity levels.  NEXT - abs, R shoulder, hip flexor length   Therapeutic Exercise Minutes 25   Evaluation Minutes 20   Total Time Of Timed Procedures 25   Total Time Of Service-Based Procedures 20   Total Treatment Time 45          Charges:  PT EVAL: Moderate Complexity, Ther Ex x2  In agreement with evaluation findings and clinical rationale, this evaluation involved MODERATE COMPLEXITY decision making due to 1-2 personal factors/comorbidities, 3 or more body structures involved/activity limitations, and evolving symptoms as documented in the evaluation.                                                                         PLAN OF CARE:    Goals: (to be met in 8 visits)   Pt report min/no pain  Pt report sleep no longer interrupted by pain  Pt to maintain level pelvis  Pt to demonstrate appropriate lifting/body mechanics  Pt independent with HEP     Frequency / Duration: Patient will be seen 2x/week or a total of 8  visits over a 90 day period. Treatment will include: Manual Therapy; Neuromuscular Re-education; Self-Care Home Management; Therapeutic Activities; Therapeutic Exercise; Home Exercise Program instruction; Patient/Family Education    Education or treatment limitation: None   Rehab Potential: excellent     Oswestry Disability Index Score  Score: (Patient-Rptd) 22 % (5/8/2025  1:19 PM)      Patient/Family/Caregiver was advised of these findings, precautions, and treatment options and has agreed to actively participate in planning and for this course of care.    Thank you for your referral. Please co-sign or sign and return this letter via fax as soon as possible to [phone]. If you have any questions, please contact me at Dept: [phone]    Sincerely,  Electronically signed by therapist: Tomas Romero, PT  Physician's certification required: Yes  I certify the need for these services furnished under this plan of treatment and while under my  care.    X___________________________________________________ Date____________________    Certification From: 5/8/2025  To:8/6/2025

## 2025-05-09 ENCOUNTER — APPOINTMENT (OUTPATIENT)
Dept: PHYSICAL THERAPY | Age: 45
End: 2025-05-09
Attending: FAMILY MEDICINE
Payer: OTHER GOVERNMENT

## 2025-05-13 ENCOUNTER — OFFICE VISIT (OUTPATIENT)
Dept: PHYSICAL THERAPY | Age: 45
End: 2025-05-13
Attending: FAMILY MEDICINE
Payer: OTHER GOVERNMENT

## 2025-05-13 PROCEDURE — 97530 THERAPEUTIC ACTIVITIES: CPT

## 2025-05-13 PROCEDURE — 97110 THERAPEUTIC EXERCISES: CPT

## 2025-05-13 NOTE — PROGRESS NOTES
Patient: Caden Matta (44 year old, male) Referring Provider:  Insurance:   Diagnosis: Other low back pain (M54.59) Gabriela LIGHT   Date of Surgery: na Next MD visit:  N/A   Precautions:  None - Referral Information:    Date of Evaluation: Req: 0, Auth: 0, Exp:     05/08/25 POC Auth Visits:  8       Today's Date   5/13/2025    Subjective  As per eval.       Pain: 3/10     Objective  See below treatment grid.          Assessment  Needed physical and verbal cues for technique for hip abd and abd bracing.    Goals (to be met in 8 visits)   Pt report min/no pain  Pt report sleep no longer interrupted by pain  Pt to maintain level pelvis  Pt to demonstrate appropriate lifting/body mechanics  Pt independent with HEP       Plan  Continue to progress core strengthening and body mechanics education.    Treatment Last 4 Visits  Treatment Day: 2       5/8/2025 5/13/2025   Spine Treatment   Therapeutic Exercise - R LE Push/L LE pull 3x10\" followed by hip adductor squeeze 1x10\"  - Discussion of lifting/body mechanics including emphasis on lift from floor with squat  - Discussion of sleeping posture adding pillow under waist in prone, pillow under knees in supine, pillow between knees in sidelying.  - Discussion of avoiding and pain-provoking activities or position, but otherwise to resume normal activity levels.  NEXT - abs, R shoulder, hip flexor length - Bike x 6'  - Ab bracing w/ PPT and LE march, then LE march up,up,down,down x 30 ea  - Sidelying hip abd at wall for technique 30x R, L   Therapeutic Activity  - Discussion of body mechanics:     - ADL's including bed transfers, logrolling     -      - lift from floor     - avoid reach fwd, high, low, lat  - Avoid pain provoking activities   Therapeutic Exercise Minutes 25 30   Therapeutic Activity Minutes  30   Evaluation Minutes 20    Total Time Of Timed Procedures 25 60   Total Time Of Service-Based Procedures 20 0   Total Treatment Time  45 60        HEP       Charges  Ther Ex x2, Ther Act x 2

## 2025-05-16 ENCOUNTER — OFFICE VISIT (OUTPATIENT)
Dept: PHYSICAL THERAPY | Age: 45
End: 2025-05-16
Attending: FAMILY MEDICINE
Payer: OTHER GOVERNMENT

## 2025-05-16 PROCEDURE — 97110 THERAPEUTIC EXERCISES: CPT

## 2025-05-17 NOTE — PROGRESS NOTES
Patient: Caden Matta (44 year old, male) Referring Provider:  Insurance:   Diagnosis: Other low back pain (M54.59) Gabriela LIGHT   Date of Surgery: na Next MD visit:  N/A   Precautions:  None - Referral Information:    Date of Evaluation: Req: 0, Auth: 0, Exp:     05/08/25 POC Auth Visits:  8       Today's Date   5/16/2025    Subjective  Doing HEP.  Having minor LBP.       Pain: 2/10     Objective  See below treatment grid.          Assessment  Good tolerance for stabilization challenges despite minor pain.    Goals (to be met in 8 visits)   Pt report min/no pain  Pt report sleep no longer interrupted by pain  Pt to maintain level pelvis  Pt to demonstrate appropriate lifting/body mechanics  Pt independent with HEP         Plan  Continue to progress core strengthening and body mechanics education.    Treatment Last 4 Visits  Treatment Day: 3       5/8/2025 5/13/2025 5/16/2025   Spine Treatment   Therapeutic Exercise - R LE Push/L LE pull 3x10\" followed by hip adductor squeeze 1x10\"  - Discussion of lifting/body mechanics including emphasis on lift from floor with squat  - Discussion of sleeping posture adding pillow under waist in prone, pillow under knees in supine, pillow between knees in sidelying.  - Discussion of avoiding and pain-provoking activities or position, but otherwise to resume normal activity levels.  NEXT - abs, R shoulder, hip flexor length - Bike x 6'  - Ab bracing w/ PPT and LE march, then LE march up,up,down,down x 30 ea  - Sidelying hip abd at wall for technique 30x R, L - Bike x 6'   - Ab bracing w/ PPT LE march up,up,down,down x 30 ea   - Supine SB     - B LE rollout     - B LE walkout     - Bridge     - Bridge with alt LE lift  - Sidelying hip abd 30x R, L   NEXT - shoulder     Therapeutic Activity  - Discussion of body mechanics:     - ADL's including bed transfers, logrolling     -      - lift from floor     - avoid reach fwd, high, low, lat  - Avoid  pain provoking activities    Therapeutic Exercise Minutes 25 30 45   Therapeutic Activity Minutes  30    Evaluation Minutes 20     Total Time Of Timed Procedures 25 60 45   Total Time Of Service-Based Procedures 20 0 0   Total Treatment Time 45 60 45   HEP   Access Code: [email or web address]  URL: [email or web address]  Date: 05/16/2025  Prepared by: Tomas Romero    Exercises  - Supine March  - 1 x daily - 7 x weekly - 3 sets - 10 reps  - Supine Hip and Knee Flexion AROM with Swiss Ball  - 1 x daily - 7 x weekly - 3 sets - 10 reps  - Supine Bridge with Pelvic Floor Contraction on Swiss Ball  - 1 x daily - 7 x weekly - 3 sets - 10 reps        HEP  Access Code: [email or web address]  URL: [email or web address]  Date: 05/16/2025  Prepared by: Tomas Romero    Exercises  - Supine March  - 1 x daily - 7 x weekly - 3 sets - 10 reps  - Supine Hip and Knee Flexion AROM with Swiss Ball  - 1 x daily - 7 x weekly - 3 sets - 10 reps  - Supine Bridge with Pelvic Floor Contraction on Swiss Ball  - 1 x daily - 7 x weekly - 3 sets - 10 reps    Charges  Ther Ex x3

## 2025-05-20 ENCOUNTER — OFFICE VISIT (OUTPATIENT)
Dept: PHYSICAL THERAPY | Age: 45
End: 2025-05-20
Attending: FAMILY MEDICINE
Payer: OTHER GOVERNMENT

## 2025-05-20 PROCEDURE — 97110 THERAPEUTIC EXERCISES: CPT

## 2025-05-20 NOTE — PROGRESS NOTES
Patient: Caden Matta (44 year old, male) Referring Provider:  Insurance:   Diagnosis: Other low back pain (M54.59) Gabriela LIGHT   Date of Surgery: na Next MD visit:  N/A   Precautions:  None - Referral Information:    Date of Evaluation: Req: 0, Auth: 0, Exp:     05/08/25 POC Auth Visits:  8       Today's Date   5/20/2025    Subjective  COntinued minor LBP/buttock.  WOnders if it all has to do with his L6.  Did get a ball.       Pain: 2/10     Objective  See below treatment grid. R shoulder pain - negative empty or full can, MMT 5/5 painfree.  Speed's test R mild discomfort.          Assessment  Good tolerance for stabilization challenges despite minor pain. R shoulder may have minor labral involvement.    Goals (to be met in 8 visits)   Pt report min/no pain  Pt report sleep no longer interrupted by pain  Pt to maintain level pelvis  Pt to demonstrate appropriate lifting/body mechanics  Pt independent with HEP           Plan  Continue to progress core strengthening and body mechanics education. Pt to avoid R sidelying and other activities/positions that provoke R shoulder discomfort.    Treatment Last 4 Visits  Treatment Day: 4       5/8/2025 5/13/2025 5/16/2025 5/20/2025   Spine Treatment   Therapeutic Exercise - R LE Push/L LE pull 3x10\" followed by hip adductor squeeze 1x10\"  - Discussion of lifting/body mechanics including emphasis on lift from floor with squat  - Discussion of sleeping posture adding pillow under waist in prone, pillow under knees in supine, pillow between knees in sidelying.  - Discussion of avoiding and pain-provoking activities or position, but otherwise to resume normal activity levels.  NEXT - abs, R shoulder, hip flexor length - Bike x 6'  - Ab bracing w/ PPT and LE march, then LE march up,up,down,down x 30 ea  - Sidelying hip abd at wall for technique 30x R, L - Bike x 6'   - Ab bracing w/ PPT LE march up,up,down,down x 30 ea   - Supine SB     - B LE  rollout     - B LE walkout     - Bridge     - Bridge with alt LE lift  - Sidelying hip abd 30x R, L   NEXT - shoulder   - Bike x 6'   - Ab bracing w/ PPT LE march up,up,down,down x 30 ea   - Seated SB ea 10x     - rollout to plank (tabletop)     - rollout to plank (tabletop) w/ alt UE lift      - rollout to plank (tabletop) w/ B UE lift     - rollout to plank (tabletop) w/ LE march     - rollout to plank (tabletop) w/ opp UE/LE lift  - Sidelying hip abd 30x R, L      Therapeutic Activity  - Discussion of body mechanics:     - ADL's including bed transfers, logrolling     -      - lift from floor     - avoid reach fwd, high, low, lat  - Avoid pain provoking activities     Therapeutic Exercise Minutes 25 30 45 45   Therapeutic Activity Minutes  30     Evaluation Minutes 20      Total Time Of Timed Procedures 25 60 45 45   Total Time Of Service-Based Procedures 20 0 0 0   Total Treatment Time 45 60 45 45   HEP   Access Code: [email or web address]  URL: [email or web address]  Date: 05/16/2025  Prepared by: Tomas Romero    Exercises  - Supine March  - 1 x daily - 7 x weekly - 3 sets - 10 reps  - Supine Hip and Knee Flexion AROM with Swiss Ball  - 1 x daily - 7 x weekly - 3 sets - 10 reps  - Supine Bridge with Pelvic Floor Contraction on Swiss Ball  - 1 x daily - 7 x weekly - 3 sets - 10 reps Texted to pt:  Access Code: [email or web address]  URL: [email or web address]  Date: 05/20/2025  Prepared by: Tomas Shuert  Exercises  - Supine March  - 1 x daily - 7 x weekly - 3 sets - 10 reps  - Supine Hip and Knee Flexion AROM with Swiss Ball  - 1 x daily - 7 x weekly - 3 sets - 10 reps  - Supine Bridge with Pelvic Floor Contraction on Swiss Ball  - 1 x daily - 7 x weekly - 3 sets - 10 reps  - Prone Plank Elbows on Ball  - 1 x daily - 7 x weekly - 3 sets - 10 reps  - Swiss Ball Walkout  - 1 x daily - 7 x weekly - 3 sets - 10 reps        HEP  Texted to pt:  Access Code: [email or web address]  URL:  [email or web address]  Date: 05/20/2025  Prepared by: Tomas Romero  Exercises  - Supine March  - 1 x daily - 7 x weekly - 3 sets - 10 reps  - Supine Hip and Knee Flexion AROM with Swiss Ball  - 1 x daily - 7 x weekly - 3 sets - 10 reps  - Supine Bridge with Pelvic Floor Contraction on Swiss Ball  - 1 x daily - 7 x weekly - 3 sets - 10 reps  - Prone Plank Elbows on Ball  - 1 x daily - 7 x weekly - 3 sets - 10 reps  - Swiss Ball Walkout  - 1 x daily - 7 x weekly - 3 sets - 10 reps    Charges  Ther Ex x3

## 2025-05-27 ENCOUNTER — OFFICE VISIT (OUTPATIENT)
Dept: PHYSICAL THERAPY | Age: 45
End: 2025-05-27
Attending: FAMILY MEDICINE
Payer: OTHER GOVERNMENT

## 2025-05-27 PROCEDURE — 97110 THERAPEUTIC EXERCISES: CPT

## 2025-05-27 NOTE — PROGRESS NOTES
Patient: Caden Matta (44 year old, male) Referring Provider:  Insurance:   Diagnosis: Other low back pain (M54.59) Gabriela LIGHT   Date of Surgery: na Next MD visit:  N/A   Precautions:  None - Referral Information:    Date of Evaluation: Req: 0, Auth: 0, Exp:     05/08/25 POC Auth Visits:  8       Today's Date   5/27/2025    Subjective  Pain relatively unchanged, but doing more activity.  Wasn't very consistent with HEP over holiday weekend.       Pain: 2/10     Objective  See below treatment grid.          Assessment  Improving core stabilization strength despite continued LBP (low level).    Goals (to be met in 8 visits)   Pt report min/no pain  Pt report sleep no longer interrupted by pain  Pt to maintain level pelvis  Pt to demonstrate appropriate lifting/body mechanics  Pt independent with HEP             Plan  Pt to try HEP on his own and f/u if needed here in 3-4 weeks.    Treatment Last 4 Visits  Treatment Day: 5       5/13/2025 5/16/2025 5/20/2025 5/27/2025   Spine Treatment   Therapeutic Exercise - Bike x 6'  - Ab bracing w/ PPT and LE march, then LE march up,up,down,down x 30 ea  - Sidelying hip abd at wall for technique 30x R, L - Bike x 6'   - Ab bracing w/ PPT LE march up,up,down,down x 30 ea   - Supine SB     - B LE rollout     - B LE walkout     - Bridge     - Bridge with alt LE lift  - Sidelying hip abd 30x R, L   NEXT - shoulder   - Bike x 6'   - Ab bracing w/ PPT LE march up,up,down,down x 30 ea   - Seated SB ea 10x     - rollout to plank (tabletop)     - rollout to plank (tabletop) w/ alt UE lift      - rollout to plank (tabletop) w/ B UE lift     - rollout to plank (tabletop) w/ LE march     - rollout to plank (tabletop) w/ opp UE/LE lift  - Sidelying hip abd 30x R, L    - Bike x 6'   - Ab bracing w/ PPT LE march up,up,down,down x 30 ea   - Supine SB      - B LE rollout      - B LE walkout      - Bridge      - Bridge with alt LE lift   - Seated SB ea 10x      -  rollout to plank (tabletop)      - rollout to plank (tabletop) w/ alt UE lift      - rollout to plank (tabletop) w/ B UE lift      - rollout to plank (tabletop) w/ LE march      - rollout to plank (tabletop) w/ opp UE/LE lift   - Sidelying hip abd 30x R, L      Therapeutic Activity - Discussion of body mechanics:     - ADL's including bed transfers, logrolling     -      - lift from floor     - avoid reach fwd, high, low, lat  - Avoid pain provoking activities   Reviewed basic body mechanics.   Therapeutic Exercise Minutes 30 45 45 45   Therapeutic Activity Minutes 30   5   Total Time Of Timed Procedures 60 45 45 50   Total Time Of Service-Based Procedures 0 0 0 0   Total Treatment Time 60 45 45 50   HEP  Access Code: [email or web address]  URL: [email or web address]  Date: 05/16/2025  Prepared by: Tomas Romero    Exercises  - Supine March  - 1 x daily - 7 x weekly - 3 sets - 10 reps  - Supine Hip and Knee Flexion AROM with Swiss Ball  - 1 x daily - 7 x weekly - 3 sets - 10 reps  - Supine Bridge with Pelvic Floor Contraction on Swiss Ball  - 1 x daily - 7 x weekly - 3 sets - 10 reps Texted to pt:  Access Code: [email or web address]  URL: [email or web address]  Date: 05/20/2025  Prepared by: Tomas Romero  Exercises  - Supine March  - 1 x daily - 7 x weekly - 3 sets - 10 reps  - Supine Hip and Knee Flexion AROM with Swiss Ball  - 1 x daily - 7 x weekly - 3 sets - 10 reps  - Supine Bridge with Pelvic Floor Contraction on Swiss Ball  - 1 x daily - 7 x weekly - 3 sets - 10 reps  - Prone Plank Elbows on Ball  - 1 x daily - 7 x weekly - 3 sets - 10 reps  - Swiss Ball Walkout  - 1 x daily - 7 x weekly - 3 sets - 10 reps         HEP  Texted to pt:  Access Code: [email or web address]  URL: [email or web address]  Date: 05/20/2025  Prepared by: Tomas Romero  Exercises  - Supine March  - 1 x daily - 7 x weekly - 3 sets - 10 reps  - Supine Hip and Knee Flexion AROM with Swiss Ball  - 1 x daily -  7 x weekly - 3 sets - 10 reps  - Supine Bridge with Pelvic Floor Contraction on Swiss Ball  - 1 x daily - 7 x weekly - 3 sets - 10 reps  - Prone Plank Elbows on Ball  - 1 x daily - 7 x weekly - 3 sets - 10 reps  - Swiss Ball Walkout  - 1 x daily - 7 x weekly - 3 sets - 10 reps    Charges  Ther Ex x3

## 2025-06-03 ENCOUNTER — OFFICE VISIT (OUTPATIENT)
Dept: NEUROLOGY | Facility: CLINIC | Age: 45
End: 2025-06-03
Payer: OTHER GOVERNMENT

## 2025-06-03 DIAGNOSIS — G43.709 CHRONIC MIGRAINE W/O AURA W/O STATUS MIGRAINOSUS, NOT INTRACTABLE: Primary | ICD-10-CM

## 2025-06-03 PROCEDURE — 64615 CHEMODENERV MUSC MIGRAINE: CPT | Performed by: OTHER

## 2025-06-03 NOTE — PROGRESS NOTES
Paperwork noting that patient may bear financial responsibility for procedure(s) performed in clinic today signed prior to proceeding with procedure(s).    Furthermore, patient notified that they should contact their insurer to verify that your procedure/test has been approved and is a COVERED benefit.  Although the [facility] staff does its due diligence, the insurance carrier gives the disclaimer that \"Although the procedure is authorized, this does not guarantee payment.\"    Ultimately the patient is responsible for payment.    Botox is:  [x] Buy and Bill  [] Patient Supplied      Botox Reauthorization Questions:  Has the patient experienced a reduction in frequency of migraines since starting Botox? yes  If yes, by what percentage? 75%  Has the intensity of migraines decreased since starting Botox? yes  If yes, by what percentage? 75%    [x] I have discussed with patient that if their insurance changes they must contact the office right away with that information so that a new prior authorization can be completed.  Patient verbalized understanding that Botox cannot be performed without a current prior authorization in place with correct insurance.

## 2025-06-10 ENCOUNTER — OFFICE VISIT (OUTPATIENT)
Dept: FAMILY MEDICINE CLINIC | Facility: CLINIC | Age: 45
End: 2025-06-10
Payer: OTHER GOVERNMENT

## 2025-06-10 VITALS
HEART RATE: 54 BPM | TEMPERATURE: 97 F | SYSTOLIC BLOOD PRESSURE: 104 MMHG | HEIGHT: 64 IN | OXYGEN SATURATION: 97 % | RESPIRATION RATE: 20 BRPM | BODY MASS INDEX: 26.4 KG/M2 | DIASTOLIC BLOOD PRESSURE: 62 MMHG | WEIGHT: 154.63 LBS

## 2025-06-10 DIAGNOSIS — M54.59 OTHER LOW BACK PAIN: Primary | ICD-10-CM

## 2025-06-10 DIAGNOSIS — R39.15 URINARY URGENCY: ICD-10-CM

## 2025-06-10 PROCEDURE — 99214 OFFICE O/P EST MOD 30 MIN: CPT | Performed by: FAMILY MEDICINE

## 2025-06-10 RX ORDER — MIRABEGRON 25 MG/1
25 TABLET, FILM COATED, EXTENDED RELEASE ORAL DAILY
Qty: 90 TABLET | Refills: 3 | Status: SHIPPED | OUTPATIENT
Start: 2025-06-10

## 2025-06-10 NOTE — PROGRESS NOTES
Subjective:   Patient ID: Caden Matta is a 44 year old male.    HPI  Patient here for f/u low back pain   Patient went to physical therapy but does not feel that it is helping much   His x rays are showing degenerative changes in the spine but no other significant findings   Also needs refill on myrbetriq that is helping with urinary urgency   History/Other:   Review of Systems    Constitutional: Negative.  Negative for activity change, appetite change, diaphoresis and fatigue.     Respiratory: Negative.  Negative for apnea, cough, chest tightness and shortness of breath.    Cardiovascular: Negative.  Negative for chest pain, palpitations and leg swelling.   Gastrointestinal: Negative.  Negative for abdominal pain.   Skin: Negative.      Urology see hpi      Psychiatric/Behavioral: Negative.        Current Medications[1]  Allergies:Allergies[2]    Objective:   Physical Exam  Constitutional:       Appearance: He is well-developed.   Cardiovascular:      Rate and Rhythm: Normal rate and regular rhythm.      Heart sounds: Normal heart sounds.   Pulmonary:      Effort: Pulmonary effort is normal.      Breath sounds: Normal breath sounds.   Abdominal:      General: Bowel sounds are normal.      Palpations: Abdomen is soft.   Neurological:      Mental Status: He is alert.      Deep Tendon Reflexes: Reflexes are normal and symmetric.         Assessment & Plan:   1. Urinary urgency    Cpm   2. Depression cpm per psychiatrist   3. Back pain - will continue with stretching and nsaids   If continues will send to PMR     No orders of the defined types were placed in this encounter.      Meds This Visit:  Requested Prescriptions     Signed Prescriptions Disp Refills    Mirabegron ER (MYRBETRIQ) 25 MG Oral Tablet 24 Hr 90 tablet 3     Sig: Take 1 tablet (25 mg total) by mouth daily.       Imaging & Referrals:  None         [1]   Current Outpatient Medications   Medication Sig Dispense Refill    Mirabegron ER  (MYRBETRIQ) 25 MG Oral Tablet 24 Hr Take 1 tablet (25 mg total) by mouth daily. 90 tablet 3    FLUoxetine HCl 40 MG Oral Cap Take 1 capsule (40 mg total) by mouth daily. 90 capsule 0    FLUoxetine 20 MG Oral Cap Take 1 capsule (20 mg total) by mouth daily. 90 capsule 0    clotrimazole 1 % External Cream Apply 1 each topically 2 (two) times daily. 30 g 0    triamcinolone 0.1 % External Cream Apply 1 each topically 2 (two) times daily as needed. 60 g 0    SUMAtriptan Succinate 100 MG Oral Tab Use at onset; repeat once after 2 HRS-ONLY 2 IN 24 HR MAX.  This is a 30 day supply. 9 tablet 11    albuterol (PROVENTIL HFA) 108 (90 Base) MCG/ACT Inhalation Aero Soln Inhale 2 puffs into the lungs every 6 (six) hours as needed for Wheezing. 3 each 1    Misc Natural Products (OSTEO BI-FLEX ADV DOUBLE ST OR) Take by mouth.      Multiple Vitamin (MULTI-VITAMIN DAILY) Oral Tab Take by mouth.     [2] No Known Allergies

## 2025-06-11 ENCOUNTER — TELEPHONE (OUTPATIENT)
Dept: FAMILY MEDICINE CLINIC | Facility: CLINIC | Age: 45
End: 2025-06-11

## 2025-06-11 DIAGNOSIS — Z00.00 ANNUAL PHYSICAL EXAM: Primary | ICD-10-CM

## 2025-06-11 NOTE — TELEPHONE ENCOUNTER
Patient called regarding his appointment he had with  recently. He stated he was under the impression that he was going to have lab work ordered. There are no orders in his chart if he does need to complete blood work.

## 2025-06-11 NOTE — TELEPHONE ENCOUNTER
Last office visit yesterday, 6/10/25 for pain.  Last physical 6/6/24.  Please advise if labs will be ordered or if patient to schedule annual physical.

## 2025-06-14 ENCOUNTER — LAB ENCOUNTER (OUTPATIENT)
Dept: LAB | Facility: HOSPITAL | Age: 45
End: 2025-06-14
Attending: FAMILY MEDICINE
Payer: OTHER GOVERNMENT

## 2025-06-14 DIAGNOSIS — Z00.00 ANNUAL PHYSICAL EXAM: ICD-10-CM

## 2025-06-14 LAB
ALBUMIN SERPL-MCNC: 5.1 G/DL (ref 3.2–4.8)
ALBUMIN/GLOB SERPL: 2.4 {RATIO} (ref 1–2)
ALP LIVER SERPL-CCNC: 59 U/L (ref 45–117)
ALT SERPL-CCNC: 26 U/L (ref 10–49)
ANION GAP SERPL CALC-SCNC: 4 MMOL/L (ref 0–18)
AST SERPL-CCNC: 24 U/L (ref ?–34)
BASOPHILS # BLD AUTO: 0.01 X10(3) UL (ref 0–0.2)
BASOPHILS NFR BLD AUTO: 0.2 %
BILIRUB SERPL-MCNC: 0.6 MG/DL (ref 0.3–1.2)
BUN BLD-MCNC: 13 MG/DL (ref 9–23)
BUN/CREAT SERPL: 11.5 (ref 10–20)
CALCIUM BLD-MCNC: 9.6 MG/DL (ref 8.7–10.4)
CHLORIDE SERPL-SCNC: 103 MMOL/L (ref 98–112)
CHOLEST SERPL-MCNC: 217 MG/DL (ref ?–200)
CO2 SERPL-SCNC: 32 MMOL/L (ref 21–32)
CREAT BLD-MCNC: 1.13 MG/DL (ref 0.7–1.3)
DEPRECATED RDW RBC AUTO: 39.7 FL (ref 35.1–46.3)
EGFRCR SERPLBLD CKD-EPI 2021: 82 ML/MIN/1.73M2 (ref 60–?)
EOSINOPHIL # BLD AUTO: 0.07 X10(3) UL (ref 0–0.7)
EOSINOPHIL NFR BLD AUTO: 1.7 %
ERYTHROCYTE [DISTWIDTH] IN BLOOD BY AUTOMATED COUNT: 11.8 % (ref 11–15)
FASTING PATIENT LIPID ANSWER: YES
FASTING STATUS PATIENT QL REPORTED: YES
GLOBULIN PLAS-MCNC: 2.1 G/DL (ref 2–3.5)
GLUCOSE BLD-MCNC: 84 MG/DL (ref 70–99)
HCT VFR BLD AUTO: 40.4 % (ref 39–53)
HDLC SERPL-MCNC: 47 MG/DL (ref 40–59)
HGB BLD-MCNC: 13.4 G/DL (ref 13–17.5)
IMM GRANULOCYTES # BLD AUTO: 0.01 X10(3) UL (ref 0–1)
IMM GRANULOCYTES NFR BLD: 0.2 %
LDLC SERPL CALC-MCNC: 154 MG/DL (ref ?–100)
LYMPHOCYTES # BLD AUTO: 2 X10(3) UL (ref 1–4)
LYMPHOCYTES NFR BLD AUTO: 48.7 %
MCH RBC QN AUTO: 30.2 PG (ref 26–34)
MCHC RBC AUTO-ENTMCNC: 33.2 G/DL (ref 31–37)
MCV RBC AUTO: 91.2 FL (ref 80–100)
MONOCYTES # BLD AUTO: 0.3 X10(3) UL (ref 0.1–1)
MONOCYTES NFR BLD AUTO: 7.3 %
NEUTROPHILS # BLD AUTO: 1.72 X10 (3) UL (ref 1.5–7.7)
NEUTROPHILS # BLD AUTO: 1.72 X10(3) UL (ref 1.5–7.7)
NEUTROPHILS NFR BLD AUTO: 41.9 %
NONHDLC SERPL-MCNC: 170 MG/DL (ref ?–130)
OSMOLALITY SERPL CALC.SUM OF ELEC: 287 MOSM/KG (ref 275–295)
PLATELET # BLD AUTO: 197 10(3)UL (ref 150–450)
POTASSIUM SERPL-SCNC: 4.1 MMOL/L (ref 3.5–5.1)
PROT SERPL-MCNC: 7.2 G/DL (ref 5.7–8.2)
RBC # BLD AUTO: 4.43 X10(6)UL (ref 4.3–5.7)
SODIUM SERPL-SCNC: 139 MMOL/L (ref 136–145)
TRIGL SERPL-MCNC: 90 MG/DL (ref 30–149)
TSI SER-ACNC: 1.44 UIU/ML (ref 0.55–4.78)
VLDLC SERPL CALC-MCNC: 17 MG/DL (ref 0–30)
WBC # BLD AUTO: 4.1 X10(3) UL (ref 4–11)

## 2025-06-14 PROCEDURE — 80061 LIPID PANEL: CPT

## 2025-06-14 PROCEDURE — 80053 COMPREHEN METABOLIC PANEL: CPT

## 2025-06-14 PROCEDURE — 84443 ASSAY THYROID STIM HORMONE: CPT

## 2025-06-14 PROCEDURE — 36415 COLL VENOUS BLD VENIPUNCTURE: CPT

## 2025-06-14 PROCEDURE — 85025 COMPLETE CBC W/AUTO DIFF WBC: CPT

## 2025-07-23 ENCOUNTER — MED REC SCAN ONLY (OUTPATIENT)
Dept: PHYSICAL MEDICINE AND REHAB | Facility: CLINIC | Age: 45
End: 2025-07-23

## (undated) NOTE — LETTER
AUTHORIZATION FOR SURGICAL OPERATION OR OTHER PROCEDURE    1. I hereby authorize Dr. Kiran Mercedes and the [facility] staff assigned to my case to perform the following operation and/or procedure at the [facility]:    Chronic migraine w/o aura w/o status migrainosus, not intractable   _______________________________________________________________________________________________    Botox 200 UNITS / BUY&BILL   _______________________________________________________________________________________________    2.  My physician has explained the nature and purpose of the operation or other procedure, possible alternative methods of treatment, the risks involved, and the possibility of complication to me.  I acknowledge that no guarantee has been made as to the result that may be obtained.  3.  I recognize that, during the course of this operation, or other procedure, unforseen conditions may necessitate additional or different procedure than those listed above.  I, therefore, further authorize and request that the above named physician, his/her physician assistants or designees perform such procedures as are, in his/her professional opinion, necessary and desirable.  4.  Any tissue or organs removed in the operation or other procedure may be disposed of by and at the discretion of the [facility] staff and [facility].  5.  I understand that in the event of a medical emergency, I will be transported by local paramedics to [facility] or other hospital emergency department.  6.  I certify that I have read and fully understand the above consent to operation and/or other procedure.    7.  I acknowledge that my physician has explained sedation/analgesia administration to me including the risks and benefits.  I consent to the administration of sedation/analgesia as may be necessary or desirable in the judgement of my physician.      Witness signature:  ___________________________________________________ Date:  ______/______/_____                    Time:  ________ A.M.  P.M.     Patient Name:  Caden Matta   [id number]   12/29/1980       Patient signature:  ___________________________________________________                 Statement of Physician  My signature below affirms that prior to the time of the procedure, I have explained to the patient and/or his/her guardian, the risks and benefits involved in the proposed treatment and any reasonable alternative to the proposed treatment.  I have also explained the risks and benefits involved in the refusal of the proposed treatment and have answered the patient's questions.                        Date:  ______/______/_______  Provider                      Signature:  __________________________________________________________       Time:  ___________ A.M    P.M.

## (undated) NOTE — LETTER
Date: May 30, 2025      Patient Name: Caden Matta      : 1980        Thank you for choosing [facility] as your health care provider. Your physician has deemed the following medical service(s) necessary. However, your insurance plan may not pay for all of your health care and costs and may deny payment for this service. The fact that your insurance plan does not pay for an item or service does not mean you should not receive it. The purpose of this form is to help you make an informed decision about whether or not you want to receive this service(s) that may not be paid for by your insurance plan.    CPT Code Description     Cost     _________  Botox 200U      $4000      _________ ______________________________ _____________      _________ ______________________________ _____________      I understand that the above mentioned service(s) or supply may not be covered by my insurance company. I agree to be financially responsible for the cost of this service or supply in the event of my insurance denies payment as a non-covered benefit.        ______________________________________________________________________  Signature of Patient or Patient's Representative  Relationship  Date    ______________________________________________________________________  Signature of Witness to signing of form   Printed Name    (PLEASE INFORM THE PATIENT TO CONTACT THE OFFICE IF THE INSURANCE CHANGES WITHIN THE YEAR AS HE/SHE WILL BE RESPONSIBLE TO UPDATE THE OFFICE IF INSURANCE CHANGES SO THAT PROCEDURE IS BILLED CORRECTLY)

## (undated) NOTE — LETTER
Date: 2024      Patient Name: Caden Matta   : 1980        Thank you for choosing [facility] as your health care provider. Your physician has deemed the following medical service(s) necessary. However, your insurance plan may not pay for all of your health care and costs and may deny payment for this service. The fact that your insurance plan does not pay for an item or service does not mean you should not receive it. The purpose of this form is to help you make an informed decision about whether or not you want to receive this service(s) that may not be paid for by your insurance plan.    CPT Code Description     Cost     Botox 200 U      $4,000  _________ ______________________________  _____________      _________ ______________________________ _____________      _________ ______________________________ _____________      I understand that the above mentioned service(s) or supply may not be covered by my insurance company. I agree to be financially responsible for the cost of this service or supply in the event of my insurance denies payment as a non-covered benefit.    (PLEASE INFORM THE PATIENT TO CONTACT THE OFFICE IF HE INSURANCE CHANGES WITHIN THE YEAR AS HE/SHE WILL BE RESPONSIBLE TO UPDATE THE OFFICE IF INSURANCE CHANGES SO THAT PROCEDURE IS BILLED CORRECTLY)       ______________________________________________________________________  Signature of Patient or Patient's Representative  Relationship  Date      ______________________________________________________________________  Signature of Witness to signing of form   Printed Name

## (undated) NOTE — LETTER
AUTHORIZATION FOR SURGICAL OPERATION OR OTHER PROCEDURE    1. I hereby authorize Dr. Kiran Mercedes and the [facility] staff assigned to my case to perform the following operation and/or procedure at the [facility]:    _______________________________________________________________________________________________     Botox 200 units   _______________________________________________________________________________________________    2.  My physician has explained the nature and purpose of the operation or other procedure, possible alternative methods of treatment, the risks involved, and the possibility of complication to me.  I acknowledge that no guarantee has been made as to the result that may be obtained.  3.  I recognize that, during the course of this operation, or other procedure, unforseen conditions may necessitate additional or different procedure than those listed above.  I, therefore, further authorize and request that the above named physician, his/her physician assistants or designees perform such procedures as are, in his/her professional opinion, necessary and desirable.  4.  Any tissue or organs removed in the operation or other procedure may be disposed of by and at the discretion of the [facility] staff and [facility].  5.  I understand that in the event of a medical emergency, I will be transported by local paramedics to [facility] or other hospital emergency department.  6.  I certify that I have read and fully understand the above consent to operation and/or other procedure.    7.  I acknowledge that my physician has explained sedation/analgesia administration to me including the risks and benefits.  I consent to the administration of sedation/analgesia as may be necessary or desirable in the judgement of my physician.    Witness signature: ___________________________________________________ Date:  ______/______/_____                    Time:  ________ A.M.   ALLEGRA Matta  [id number]  12/29/1980         Patient signature:  ___________________________________________________               Statement of Physician  My signature below affirms that prior to the time of the procedure, I have explained to the patient and/or his/her guardian, the risks and benefits involved in the proposed treatment and any reasonable alternative to the proposed treatment.  I have also explained the risks and benefits involved in the refusal of the proposed treatment and have answered the patient's questions.                        Date:  ______/______/_______  Provider                      Signature:  __________________________________________________________       Time:  ___________ MAVIS DINH

## (undated) NOTE — LETTER
AUTHORIZATION FOR SURGICAL OPERATION OR OTHER PROCEDURE    1. I hereby authorize Dr. Kiran Mercedes and the [facility] staff assigned to my case to perform the following operation and/or procedure at the [facility]:    _______________________________________________________________________________________________     Botox 200U   _______________________________________________________________________________________________    2.  My physician has explained the nature and purpose of the operation or other procedure, possible alternative methods of treatment, the risks involved, and the possibility of complication to me.  I acknowledge that no guarantee has been made as to the result that may be obtained.  3.  I recognize that, during the course of this operation, or other procedure, unforseen conditions may necessitate additional or different procedure than those listed above.  I, therefore, further authorize and request that the above named physician, his/her physician assistants or designees perform such procedures as are, in his/her professional opinion, necessary and desirable.  4.  Any tissue or organs removed in the operation or other procedure may be disposed of by and at the discretion of the [facility] staff and [facility].  5.  I understand that in the event of a medical emergency, I will be transported by local paramedics to [facility] or other hospital emergency department.  6.  I certify that I have read and fully understand the above consent to operation and/or other procedure.    7.  I acknowledge that my physician has explained sedation/analgesia administration to me including the risks and benefits.  I consent to the administration of sedation/analgesia as may be necessary or desirable in the judgement of my physician.    Witness signature: ___________________________________________________ Date:  ______/______/_____                    Time:  ________ A.M.  P.M.        Caden Matta  [id number]  12/29/1980       Patient signature:  ___________________________________________________               Statement of Physician  My signature below affirms that prior to the time of the procedure, I have explained to the patient and/or his/her guardian, the risks and benefits involved in the proposed treatment and any reasonable alternative to the proposed treatment.  I have also explained the risks and benefits involved in the refusal of the proposed treatment and have answered the patient's questions.                        Date:  ______/______/_______  Provider                      Signature:  __________________________________________________________       Time:  ___________ A.TANYA    P.MHailey

## (undated) NOTE — LETTER
Date: 2024      Patient Name: Caden Matta      : 1980        Thank you for choosing [facility] as your health care provider. Your physician has deemed the following medical service(s) necessary. However, your insurance plan may not pay for all of your health care and costs and may deny payment for this service. The fact that your insurance plan does not pay for an item or service does not mean you should not receive it. The purpose of this form is to help you make an informed decision about whether or not you want to receive this service(s) that may not be paid for by your insurance plan.    CPT Code Description     Cost     _________  Botox 200 units       _________ ______________________________ _____________      _________ ______________________________ _____________      I understand that the above mentioned service(s) or supply may not be covered by my insurance company. I agree to be financially responsible for the cost of this service or supply in the event of my insurance denies payment as a non-covered benefit.        ______________________________________________________________________  Signature of Patient or Patient's Representative  Relationship  Date    ______________________________________________________________________  Signature of Witness to signing of form   Printed Name

## (undated) NOTE — LETTER
AUTHORIZATION FOR SURGICAL OPERATION OR OTHER PROCEDURE    1. I hereby authorize Dr. Kiran Mercedes and the [facility] staff assigned to my case to perform the following operation and/or procedure at the [facility]:  Botox 200 units BUY[employer]LL   _______________________________________________________________________________________________    Chronic migraine w/o aura w/o status migrainosus, not intractable   _______________________________________________________________________________________________    2.  My physician has explained the nature and purpose of the operation or other procedure, possible alternative methods of treatment, the risks involved, and the possibility of complication to me.  I acknowledge that no guarantee has been made as to the result that may be obtained.  3.  I recognize that, during the course of this operation, or other procedure, unforseen conditions may necessitate additional or different procedure than those listed above.  I, therefore, further authorize and request that the above named physician, his/her physician assistants or designees perform such procedures as are, in his/her professional opinion, necessary and desirable.  4.  Any tissue or organs removed in the operation or other procedure may be disposed of by and at the discretion of the [facility] staff and [facility].  5.  I understand that in the event of a medical emergency, I will be transported by local paramedics to [facility] or other hospital emergency department.  6.  I certify that I have read and fully understand the above consent to operation and/or other procedure.    7.  I acknowledge that my physician has explained sedation/analgesia administration to me including the risks and benefits.  I consent to the administration of sedation/analgesia as may be necessary or desirable in the judgement of my physician.    Witness signature:  ___________________________________________________ Date:  ______/______/_____                    Time:  ________ A.M.  P.M.       Patient Name: Caden Matta  12/29/1980  [id number]       Patient signature:  ___________________________________________________                 Statement of Physician  My signature below affirms that prior to the time of the procedure, I have explained to the patient and/or his/her guardian, the risks and benefits involved in the proposed treatment and any reasonable alternative to the proposed treatment.  I have also explained the risks and benefits involved in the refusal of the proposed treatment and have answered the patient's questions.                        Date:  ______/______/_______  Provider                      Signature:  __________________________________________________________       Time:  ___________ A.M    P.M.

## (undated) NOTE — LETTER
Date: 2025      Patient Name: Caden Matta  : 1980        Thank you for choosing [facility] as your health care provider. Your physician has deemed the following medical service(s) necessary. However, your insurance plan may not pay for all of your health care and costs and may deny payment for this service. The fact that your insurance plan does not pay for an item or service does not mean you should not receive it. The purpose of this form is to help you make an informed decision about whether or not you want to receive this service(s) that may not be paid for by your insurance plan.    CPT Code Description     Cost     _________ BOTOX 200 UNITS _______________  $4000____      _________ ______________________________ _____________      _________ ______________________________ _____________      I understand that the above mentioned service(s) or supply may not be covered by my insurance company. I agree to be financially responsible for the cost of this service or supply in the event of my insurance denies payment as a non-covered benefit.    Authorization is not required based on medical necessity, however, is not a guarantee of payment and may be subject to review once claim is submitted.     Number of visits Approved: 1Status: Approved-Authorization is not required based on medical necessity when being performed, however is not a guarantee of payment and may be subject to review once claim is submitted-Covered Benefit   this will be 1 of 1  (PLEASE INFORM THE PATIENT TO CONTACT THE OFFICE IF HE INSURANCE CHANGES WITHIN THE YEAR AS HE/SHE WILL BE RESPONSIBLE TO UPDATE THE OFFICE IF INSURANCE CHANGES SO THAT PROCEDURE IS BILLED CORRECTLY)        ______________________________________________________________________  Signature of Patient or Patient's Representative  Relationship  Date      ______________________________________________________________________  Signature of  Witness to signing of form   Printed Name